# Patient Record
Sex: FEMALE | Race: WHITE | NOT HISPANIC OR LATINO | Employment: OTHER | URBAN - NONMETROPOLITAN AREA
[De-identification: names, ages, dates, MRNs, and addresses within clinical notes are randomized per-mention and may not be internally consistent; named-entity substitution may affect disease eponyms.]

---

## 2023-03-06 ENCOUNTER — TELEPHONE (OUTPATIENT)
Dept: PRIMARY CARE | Facility: CLINIC | Age: 68
End: 2023-03-06
Payer: MEDICARE

## 2023-03-06 DIAGNOSIS — J02.9 ACUTE PHARYNGITIS, UNSPECIFIED ETIOLOGY: Primary | ICD-10-CM

## 2023-03-06 RX ORDER — AMOXICILLIN AND CLAVULANATE POTASSIUM 875; 125 MG/1; MG/1
875 TABLET, FILM COATED ORAL 2 TIMES DAILY
Qty: 20 TABLET | Refills: 0 | Status: SHIPPED | OUTPATIENT
Start: 2023-03-06 | End: 2023-03-16

## 2023-03-06 NOTE — TELEPHONE ENCOUNTER
----- Message from Hyacinth Harper DO sent at 3/6/2023 10:00 AM EST -----  Went to [facility] care on Friday, given zpack, not helping

## 2023-03-20 ENCOUNTER — TELEPHONE (OUTPATIENT)
Dept: PRIMARY CARE | Facility: CLINIC | Age: 68
End: 2023-03-20
Payer: MEDICARE

## 2023-03-20 DIAGNOSIS — Z12.2 ENCOUNTER FOR SCREENING FOR LUNG CANCER: Primary | ICD-10-CM

## 2023-06-05 DIAGNOSIS — E78.2 MIXED HYPERLIPIDEMIA: Primary | ICD-10-CM

## 2023-06-05 RX ORDER — EZETIMIBE 10 MG/1
10 TABLET ORAL DAILY
Qty: 90 TABLET | Refills: 3 | Status: SHIPPED | OUTPATIENT
Start: 2023-06-05 | End: 2024-05-31

## 2023-06-05 RX ORDER — EZETIMIBE 10 MG/1
10 TABLET ORAL DAILY
COMMUNITY
End: 2023-06-05 | Stop reason: SDUPTHER

## 2023-06-08 DIAGNOSIS — F17.210 CIGARETTE SMOKER: ICD-10-CM

## 2023-06-08 DIAGNOSIS — Z12.2 SCREENING FOR MALIGNANT NEOPLASM OF RESPIRATORY ORGAN: Primary | ICD-10-CM

## 2023-06-15 ENCOUNTER — TELEMEDICINE (OUTPATIENT)
Dept: PRIMARY CARE | Facility: CLINIC | Age: 68
End: 2023-06-15
Payer: MEDICARE

## 2023-06-15 DIAGNOSIS — J44.1 COPD EXACERBATION (MULTI): ICD-10-CM

## 2023-06-15 DIAGNOSIS — R93.89 ABNORMAL CT SCAN, CHEST: ICD-10-CM

## 2023-06-15 DIAGNOSIS — E11.9 TYPE 2 DIABETES MELLITUS WITHOUT COMPLICATION, WITHOUT LONG-TERM CURRENT USE OF INSULIN (MULTI): ICD-10-CM

## 2023-06-15 DIAGNOSIS — J43.2 CENTRILOBULAR EMPHYSEMA (MULTI): Primary | ICD-10-CM

## 2023-06-15 PROBLEM — F17.210 CIGARETTE SMOKER: Status: ACTIVE | Noted: 2023-06-15

## 2023-06-15 PROBLEM — J44.9 COPD (CHRONIC OBSTRUCTIVE PULMONARY DISEASE) (MULTI): Status: ACTIVE | Noted: 2023-06-15

## 2023-06-15 PROBLEM — I65.29 CAROTID ARTERY STENOSIS: Status: ACTIVE | Noted: 2023-06-15

## 2023-06-15 PROBLEM — E78.2 MIXED HYPERLIPIDEMIA: Status: ACTIVE | Noted: 2023-06-15

## 2023-06-15 PROBLEM — M50.20 COMPRESSED CERVICAL DISC: Status: ACTIVE | Noted: 2023-06-15

## 2023-06-15 PROBLEM — R42 DIZZINESS: Status: ACTIVE | Noted: 2023-06-15

## 2023-06-15 PROBLEM — I10 HTN (HYPERTENSION): Status: ACTIVE | Noted: 2023-06-15

## 2023-06-15 PROCEDURE — 99213 OFFICE O/P EST LOW 20 MIN: CPT | Performed by: INTERNAL MEDICINE

## 2023-06-15 RX ORDER — MELOXICAM 15 MG/1
7.5 TABLET ORAL
COMMUNITY
End: 2024-05-06 | Stop reason: WASHOUT

## 2023-06-15 RX ORDER — GABAPENTIN 300 MG/1
CAPSULE ORAL
COMMUNITY
End: 2024-02-29 | Stop reason: SDUPTHER

## 2023-06-15 RX ORDER — LORATADINE 10 MG/1
1 TABLET ORAL DAILY
COMMUNITY

## 2023-06-15 RX ORDER — DOXYCYCLINE 100 MG/1
100 CAPSULE ORAL 2 TIMES DAILY
Qty: 20 CAPSULE | Refills: 0 | Status: SHIPPED | OUTPATIENT
Start: 2023-06-15 | End: 2023-06-29 | Stop reason: ALTCHOICE

## 2023-06-15 RX ORDER — FLUTICASONE PROPIONATE 50 MCG
2 SPRAY, SUSPENSION (ML) NASAL
COMMUNITY

## 2023-06-15 RX ORDER — ALBUTEROL SULFATE 0.83 MG/ML
SOLUTION RESPIRATORY (INHALATION)
COMMUNITY
Start: 2022-05-27

## 2023-06-15 RX ORDER — DOXYCYCLINE 100 MG/1
100 CAPSULE ORAL 2 TIMES DAILY
Qty: 14 CAPSULE | Refills: 0 | Status: SHIPPED | OUTPATIENT
Start: 2023-06-15 | End: 2023-06-15 | Stop reason: SDUPTHER

## 2023-06-15 RX ORDER — METFORMIN HYDROCHLORIDE 500 MG/1
1000 TABLET, EXTENDED RELEASE ORAL 2 TIMES DAILY
COMMUNITY
Start: 2017-03-15 | End: 2024-02-29 | Stop reason: SDUPTHER

## 2023-06-15 ASSESSMENT — ENCOUNTER SYMPTOMS
SHORTNESS OF BREATH: 1
RHINORRHEA: 0
FATIGUE: 0
DIZZINESS: 1
FEVER: 0
APPETITE CHANGE: 0
WHEEZING: 1
ACTIVITY CHANGE: 0
COUGH: 1

## 2023-06-29 RX ORDER — MELATONIN 10 MG
1 CAPSULE ORAL NIGHTLY
COMMUNITY

## 2023-06-29 RX ORDER — MEDROXYPROGESTERONE ACETATE 10 MG/1
10 TABLET ORAL DAILY
COMMUNITY
End: 2024-03-05

## 2023-06-29 RX ORDER — VALSARTAN 40 MG/1
40 TABLET ORAL DAILY
COMMUNITY
End: 2024-02-29 | Stop reason: SDUPTHER

## 2023-06-29 RX ORDER — ESTRADIOL 0.1 MG/G
CREAM VAGINAL
COMMUNITY
Start: 2022-12-15

## 2023-06-29 RX ORDER — BLOOD-GLUCOSE METER
KIT MISCELLANEOUS
COMMUNITY
Start: 2019-03-29

## 2023-06-30 ENCOUNTER — TELEPHONE (OUTPATIENT)
Dept: PRIMARY CARE | Facility: CLINIC | Age: 68
End: 2023-06-30

## 2023-06-30 ENCOUNTER — OFFICE VISIT (OUTPATIENT)
Dept: PRIMARY CARE | Facility: CLINIC | Age: 68
End: 2023-06-30
Payer: MEDICARE

## 2023-06-30 VITALS
SYSTOLIC BLOOD PRESSURE: 138 MMHG | DIASTOLIC BLOOD PRESSURE: 80 MMHG | HEIGHT: 64 IN | WEIGHT: 105 LBS | HEART RATE: 108 BPM | BODY MASS INDEX: 17.93 KG/M2

## 2023-06-30 DIAGNOSIS — R42 DIZZINESS: Primary | ICD-10-CM

## 2023-06-30 DIAGNOSIS — R93.0 ABNORMAL CT OF THE HEAD: ICD-10-CM

## 2023-06-30 PROCEDURE — 1159F MED LIST DOCD IN RCRD: CPT | Performed by: INTERNAL MEDICINE

## 2023-06-30 PROCEDURE — 4010F ACE/ARB THERAPY RXD/TAKEN: CPT | Performed by: INTERNAL MEDICINE

## 2023-06-30 PROCEDURE — 1157F ADVNC CARE PLAN IN RCRD: CPT | Performed by: INTERNAL MEDICINE

## 2023-06-30 PROCEDURE — 3079F DIAST BP 80-89 MM HG: CPT | Performed by: INTERNAL MEDICINE

## 2023-06-30 PROCEDURE — 1160F RVW MEDS BY RX/DR IN RCRD: CPT | Performed by: INTERNAL MEDICINE

## 2023-06-30 PROCEDURE — 3044F HG A1C LEVEL LT 7.0%: CPT | Performed by: INTERNAL MEDICINE

## 2023-06-30 PROCEDURE — 3075F SYST BP GE 130 - 139MM HG: CPT | Performed by: INTERNAL MEDICINE

## 2023-06-30 PROCEDURE — 99214 OFFICE O/P EST MOD 30 MIN: CPT | Performed by: INTERNAL MEDICINE

## 2023-06-30 RX ORDER — MECLIZINE HYDROCHLORIDE 25 MG/1
TABLET ORAL
COMMUNITY
Start: 2023-06-19 | End: 2023-06-30 | Stop reason: SINTOL

## 2023-06-30 ASSESSMENT — ENCOUNTER SYMPTOMS: DIZZINESS: 1

## 2023-06-30 NOTE — PROGRESS NOTES
"Subjective   Patient ID: Nanette Elliott is a 67 y.o. female who presents for ER Follow-up (Dizziness/Vertigo -Given Meclizine but it makes her sleepy so she does not take it/Lung infection).  ER Follow-up      Patient is here today for ED follow up  Patient was seen in the ED on 6/19/23 for dizziness. She reports that that the dizziness is constant, but now It is better than it was.  She reports that he dizziness has been for over a month.  Had advised her to lower her blood pressure mediation and see if that helped, did not change much.   She had a head cT that was negative for acute CVA but showed chronic microvascular ischemic versus chronic hypertensive changes, presumed old lacunar type infarct versus dilated perivascular space of the left inferior basal ganglia, additional small old-appearing lacunar type infarct of the left basal ganglia in the region of the anterior limb of the internal capsule on the left. This is similar to 2019 CT scan.     Review of Systems   Neurological:  Positive for dizziness.       Objective   /80 (BP Location: Right arm, Patient Position: Sitting, BP Cuff Size: Adult)   Pulse 108   Ht 1.626 m (5' 4\")   Wt 47.6 kg (105 lb)   BMI 18.02 kg/m²     Physical Exam  Constitutional:       General: She is not in acute distress.     Appearance: Normal appearance.   HENT:      Head: Normocephalic and atraumatic.      Right Ear: Tympanic membrane, ear canal and external ear normal.      Left Ear: Ear canal and external ear normal.      Nose: Nose normal.      Mouth/Throat:      Mouth: Mucous membranes are moist.      Pharynx: No oropharyngeal exudate or posterior oropharyngeal erythema.   Eyes:      General:         Right eye: No discharge.         Left eye: No discharge.      Extraocular Movements: Extraocular movements intact.      Pupils: Pupils are equal, round, and reactive to light.   Cardiovascular:      Rate and Rhythm: Normal rate and regular rhythm.      Pulses: Normal " pulses.      Heart sounds: No murmur heard.  Pulmonary:      Effort: Pulmonary effort is normal. No respiratory distress.      Breath sounds: No wheezing or rhonchi.   Neurological:      Mental Status: She is alert.   Psychiatric:         Mood and Affect: Mood normal.         Behavior: Behavior normal.         Thought Content: Thought content normal.         Interpreted By:  MT VANCE MD  MRN: [id number]  Patient Name: MARIAELENA ZAVALA     STUDY:  CT HEAD WO CONTRAST;  6/19/2023 10:52 am     INDICATION:  dizzy .     COMPARISON:  Head CT 03/27/2019     ACCESSION NUMBER(S):  [id number]     ORDERING CLINICIAN:  YOON MARK     TECHNIQUE:  Noncontrast axial CT scan of head was performed. Angled reformats in  brain and bone windows were generated. The images were reviewed in  bone, brain, blood and soft tissue windows.     FINDINGS:  CSF Spaces: Ventricles appear stable with respect to size and  configuration. There is no extraaxial fluid collection.     Parenchyma: Periventricular and subcortical white matter  hypoattenuation is nonspecific, however likely reflects chronic  microvascular ischemic versus chronic hypertensive changes. Presumed  old lacunar type infarct versus dilated perivascular space of the  left inferior basal ganglia. Additional small old-appearing lacunar  type infarct of the left basal ganglia in the region of the anterior  limb of the internal capsule along the left. The grey-white  differentiation is intact. There is no mass effect or midline shift.  There is no intracranial hemorrhage.     Calvarium: No acute displaced calvarial fracture.     Paranasal sinuses and mastoids: Visualized paranasal sinuses and  mastoids are clear.     IMPRESSION:  No CT evidence of acute intracranial pathology.     Similar intracranial findings since comparison head CT 03/27/2019.  Assessment/Plan   Problem List Items Addressed This Visit       Dizziness - Primary     Dizziness   - HEENT exam ok, possible  previous stroke, she had similar findings in CT head from 2019, will order MRI Brain wo contrast to see definitely if she has had a previous stroke  - advised trying to cut antivert in half and see if that helps   - consider vestibular therapy   - pt advised to call if anything changes       Addendum 8/8/23  Pt called office stating stilll having dizzines and unsteadiness, asking for rolator walker with seat. This is medially necessary as a cane does not provide enough stability and is important to keep her from falling as she is a high falls risk. Patient has MRI brain pending to see about her having previous unknown strokes.   Final diagnoses:   [R42] Dizziness

## 2023-06-30 NOTE — TELEPHONE ENCOUNTER
Pt states she forgot to tell you at her visit that she feels like she always feels like she has to have a bowel movement but doesn't go all the time

## 2023-07-07 ENCOUNTER — PATIENT OUTREACH (OUTPATIENT)
Dept: CARE COORDINATION | Facility: CLINIC | Age: 68
End: 2023-07-07
Payer: MEDICARE

## 2023-07-07 NOTE — PROGRESS NOTES
Discharge Facility: [facility]  Discharge Diagnosis: Syncope  Admission Date: 7/4/2023  Discharge Date: 7/5/2023    PCP Appointment Date: 7/18/2023 1310    Hospital Encounter and Summary: Linked    See discharge assessment below for further details    Engagement  Call Start Time: 1016 (7/7/2023 10:17 AM)    Medications  Medications reviewed with patient/caregiver?: Not applicable (7/7/2023 10:17 AM)  Does the patient have all medications ordered at discharge?: Not applicable (7/7/2023 10:17 AM)  Care Management Interventions: No intervention needed (7/7/2023 10:17 AM)  Is the patient taking all medications as directed (includes completed medication regime)?: Yes (7/7/2023 10:17 AM)    Appointments  Does the patient have a primary care provider?: Yes (7/7/2023 10:17 AM)  Care Management Interventions: Verified appointment date/time/provider (7/7/2023 10:17 AM)  Has the patient kept scheduled appointments due by today?: Yes (7/7/2023 10:17 AM)    Self Management  Has home health visited the patient within 72 hours of discharge?: Not applicable (7/7/2023 10:17 AM)    Patient Teaching  Does the patient have access to their discharge instructions?: Yes (7/7/2023 10:17 AM)  Care Management Interventions: Reviewed instructions with patient (7/7/2023 10:17 AM)  What is the patient's perception of their health status since discharge?: Improving (7/7/2023 10:17 AM)  Is the patient/caregiver able to teach back the hierarchy of who to call/visit for symptoms/problems? PCP, Specialist, Home Health nurse, Urgent Care, ED, 911: Yes (7/7/2023 10:17 AM)    Wrap Up  Wrap Up Additional Comments: Pt reports feeling much better since being home. Pt states the dizziness comes and goes. Pt was discharged with a Holter monitor. Pt denies any questions, needs or concerns at this time. Pt has follow up pcp appt 7/18/2023 1310, verified with pt. Pt encouraged to call if any issues arise. (7/7/2023 10:17 AM)  Call End Time: 1021 (7/7/2023 10:17  AM)

## 2023-07-18 ENCOUNTER — OFFICE VISIT (OUTPATIENT)
Dept: PRIMARY CARE | Facility: CLINIC | Age: 68
End: 2023-07-18
Payer: MEDICARE

## 2023-07-18 VITALS
HEIGHT: 65 IN | TEMPERATURE: 98.7 F | BODY MASS INDEX: 17.49 KG/M2 | OXYGEN SATURATION: 96 % | SYSTOLIC BLOOD PRESSURE: 130 MMHG | HEART RATE: 96 BPM | WEIGHT: 105 LBS | DIASTOLIC BLOOD PRESSURE: 71 MMHG

## 2023-07-18 DIAGNOSIS — R42 VERTIGO: Primary | ICD-10-CM

## 2023-07-18 PROCEDURE — 4010F ACE/ARB THERAPY RXD/TAKEN: CPT | Performed by: PHYSICIAN ASSISTANT

## 2023-07-18 PROCEDURE — 3078F DIAST BP <80 MM HG: CPT | Performed by: PHYSICIAN ASSISTANT

## 2023-07-18 PROCEDURE — 99214 OFFICE O/P EST MOD 30 MIN: CPT | Performed by: PHYSICIAN ASSISTANT

## 2023-07-18 PROCEDURE — 3075F SYST BP GE 130 - 139MM HG: CPT | Performed by: PHYSICIAN ASSISTANT

## 2023-07-18 PROCEDURE — 1159F MED LIST DOCD IN RCRD: CPT | Performed by: PHYSICIAN ASSISTANT

## 2023-07-18 PROCEDURE — 99495 TRANSJ CARE MGMT MOD F2F 14D: CPT | Performed by: PHYSICIAN ASSISTANT

## 2023-07-18 PROCEDURE — 3044F HG A1C LEVEL LT 7.0%: CPT | Performed by: PHYSICIAN ASSISTANT

## 2023-07-18 PROCEDURE — 1157F ADVNC CARE PLAN IN RCRD: CPT | Performed by: PHYSICIAN ASSISTANT

## 2023-07-18 PROCEDURE — 1160F RVW MEDS BY RX/DR IN RCRD: CPT | Performed by: PHYSICIAN ASSISTANT

## 2023-07-18 RX ORDER — HYDROXYZINE HYDROCHLORIDE 25 MG/1
TABLET, FILM COATED ORAL
Qty: 45 TABLET | Refills: 0 | Status: SHIPPED | OUTPATIENT
Start: 2023-07-18 | End: 2024-05-06 | Stop reason: WASHOUT

## 2023-07-18 ASSESSMENT — PATIENT HEALTH QUESTIONNAIRE - PHQ9
1. LITTLE INTEREST OR PLEASURE IN DOING THINGS: SEVERAL DAYS
2. FEELING DOWN, DEPRESSED OR HOPELESS: SEVERAL DAYS
SUM OF ALL RESPONSES TO PHQ9 QUESTIONS 1 AND 2: 2

## 2023-07-18 NOTE — PROGRESS NOTES
"Subjective   Patient ID: Nanette Elliott is a 67 y.o. female who presents for Hospital Follow-up ( hospital discharge x 2 weeks ago for vertigo and fainting./Patient states told she was dehydrated./Continues with vertigo throughout the day that last most of the day.).  HPI  Patient presents in hospital follow-up.  Patient presented to the ER with a syncopal episode/dizziness.  CT head in ER was negative labs are relatively unremarkable vitals were stable and the patient was admitted.  Patient continued work-up for CVA including MRI of head and neck which were also unremarkable.  Serial troponins were negative patient remained stable while admitted.  Patient states vertigo has been going on for months and has waxed and waned.  Patient was prescribed meclizine the patient states this is too sedative to tolerate.    Currently, the patient is symptomatic.  Review of Systems    Constitutional:  See HPI   ENT: See HPI  Neurologic:  Alert and oriented X4, No numbness, No tingling.    All other systems are negative     Objective     /71   Pulse 96   Temp 37.1 °C (98.7 °F) (Temporal)   Ht 1.651 m (5' 5\")   Wt 47.6 kg (105 lb)   SpO2 96%   BMI 17.47 kg/m²     Physical Exam    General:  Alert and oriented, No acute distress.    Eye:  Pupils are equal, round and reactive to light, Normal conjunctiva.  Right lateral nystagmus noted, recreated complaint  HENT:  Normocephalic,   Neck:  Supple    Respiratory: Respirations are non-labored   Musculoskeletal: Normal ROM and strength  Integumentary:  Warm, Dry, Intact, No pallor, No rash.    Neurologic:  Alert, Oriented, Normal sensory, Cranial Nerves II-XII are grossly intact  Psychiatric:  Cooperative, Appropriate mood & affect.    Assessment/Plan   Vertigo: We will try hydroxyzine for symptoms.  Patient advised to try half a pill if 1 is to sedative.  Referrals to both ENT and vestibular physical therapy were ordered.  Problem List Items Addressed This Visit  "   None  Visit Diagnoses       Vertigo    -  Primary    Relevant Medications    hydrOXYzine HCL (Atarax) 25 mg tablet    Other Relevant Orders    Referral to ENT    Referral to Physical Therapy            Final diagnoses:   [R42] Vertigo

## 2023-07-21 ENCOUNTER — PATIENT OUTREACH (OUTPATIENT)
Dept: CARE COORDINATION | Facility: CLINIC | Age: 68
End: 2023-07-21
Payer: MEDICARE

## 2023-07-21 NOTE — PROGRESS NOTES
"Call regarding appt. with PCP on 7/18/2023 after hospitalization.  At time of outreach call the patient feels as if their condition has improved slightly since last visit. Pt reports some dizziness but \"not as bad as it was\".  Reviewed the PCP appointment with the pt and addressed any questions or concerns.  Pt states she received ENT referral and has appt with Dr. Anderson 7/25.   Pt states after trying to take half dose of hydroxyzine she is still feeling drowsy and is unable to tolerate it.  Pt denies any other questions, needs, or concerns.  Pt encouraged to call if questions arise.  "

## 2023-08-07 ENCOUNTER — TELEPHONE (OUTPATIENT)
Dept: PRIMARY CARE | Facility: CLINIC | Age: 68
End: 2023-08-07
Payer: MEDICARE

## 2023-08-17 DIAGNOSIS — J01.00 ACUTE NON-RECURRENT MAXILLARY SINUSITIS: ICD-10-CM

## 2023-08-17 RX ORDER — AMOXICILLIN 875 MG/1
875 TABLET, FILM COATED ORAL 2 TIMES DAILY
Qty: 20 TABLET | Refills: 0 | Status: SHIPPED | OUTPATIENT
Start: 2023-08-17 | End: 2023-08-27

## 2023-08-21 ENCOUNTER — PATIENT OUTREACH (OUTPATIENT)
Dept: CARE COORDINATION | Facility: CLINIC | Age: 68
End: 2023-08-21
Payer: MEDICARE

## 2023-08-21 NOTE — PROGRESS NOTES
Unable to reach patient for one month post discharge follow up call.   Left voicemail with call back number for patient to call if needed   If no voicemail available call attempts x 2 were made to contact the patient to assist with any questions or concerns patient may have.

## 2023-09-13 ENCOUNTER — LAB (OUTPATIENT)
Dept: LAB | Facility: LAB | Age: 68
End: 2023-09-13
Payer: MEDICARE

## 2023-09-13 DIAGNOSIS — E11.9 TYPE 2 DIABETES MELLITUS WITHOUT COMPLICATION, WITHOUT LONG-TERM CURRENT USE OF INSULIN (MULTI): ICD-10-CM

## 2023-09-13 LAB
ALANINE AMINOTRANSFERASE (SGPT) (U/L) IN SER/PLAS: 9 U/L (ref 7–45)
ALBUMIN (G/DL) IN SER/PLAS: 4.3 G/DL (ref 3.4–5)
ALKALINE PHOSPHATASE (U/L) IN SER/PLAS: 19 U/L (ref 33–136)
ANION GAP IN SER/PLAS: 14 MMOL/L (ref 10–20)
ASPARTATE AMINOTRANSFERASE (SGOT) (U/L) IN SER/PLAS: 11 U/L (ref 9–39)
BILIRUBIN TOTAL (MG/DL) IN SER/PLAS: 0.3 MG/DL (ref 0–1.2)
CALCIUM (MG/DL) IN SER/PLAS: 9.3 MG/DL (ref 8.6–10.3)
CARBON DIOXIDE, TOTAL (MMOL/L) IN SER/PLAS: 27 MMOL/L (ref 21–32)
CHLORIDE (MMOL/L) IN SER/PLAS: 104 MMOL/L (ref 98–107)
CREATININE (MG/DL) IN SER/PLAS: 0.7 MG/DL (ref 0.5–1.05)
ESTIMATED AVERAGE GLUCOSE FOR HBA1C: 148 MG/DL
GFR FEMALE: >90 ML/MIN/1.73M2
GLUCOSE (MG/DL) IN SER/PLAS: 116 MG/DL (ref 74–99)
HEMOGLOBIN A1C/HEMOGLOBIN TOTAL IN BLOOD: 6.8 %
POTASSIUM (MMOL/L) IN SER/PLAS: 4.5 MMOL/L (ref 3.5–5.3)
PROTEIN TOTAL: 6.7 G/DL (ref 6.4–8.2)
SODIUM (MMOL/L) IN SER/PLAS: 140 MMOL/L (ref 136–145)
UREA NITROGEN (MG/DL) IN SER/PLAS: 16 MG/DL (ref 6–23)

## 2023-09-13 PROCEDURE — 80053 COMPREHEN METABOLIC PANEL: CPT

## 2023-09-13 PROCEDURE — 36415 COLL VENOUS BLD VENIPUNCTURE: CPT

## 2023-09-13 PROCEDURE — 83036 HEMOGLOBIN GLYCOSYLATED A1C: CPT

## 2023-09-14 ENCOUNTER — OFFICE VISIT (OUTPATIENT)
Dept: PRIMARY CARE | Facility: CLINIC | Age: 68
End: 2023-09-14
Payer: MEDICARE

## 2023-09-14 VITALS
SYSTOLIC BLOOD PRESSURE: 139 MMHG | HEART RATE: 121 BPM | DIASTOLIC BLOOD PRESSURE: 78 MMHG | HEIGHT: 65 IN | WEIGHT: 108 LBS | BODY MASS INDEX: 17.99 KG/M2

## 2023-09-14 DIAGNOSIS — E78.2 MIXED HYPERLIPIDEMIA: ICD-10-CM

## 2023-09-14 DIAGNOSIS — I65.29 STENOSIS OF CAROTID ARTERY, UNSPECIFIED LATERALITY: ICD-10-CM

## 2023-09-14 DIAGNOSIS — F17.210 CIGARETTE SMOKER: ICD-10-CM

## 2023-09-14 DIAGNOSIS — J43.2 CENTRILOBULAR EMPHYSEMA (MULTI): ICD-10-CM

## 2023-09-14 DIAGNOSIS — R91.8 MULTIPLE LUNG NODULES: ICD-10-CM

## 2023-09-14 DIAGNOSIS — E11.9 TYPE 2 DIABETES MELLITUS WITHOUT COMPLICATION, WITHOUT LONG-TERM CURRENT USE OF INSULIN (MULTI): ICD-10-CM

## 2023-09-14 DIAGNOSIS — I10 PRIMARY HYPERTENSION: ICD-10-CM

## 2023-09-14 DIAGNOSIS — Z12.2 ENCOUNTER FOR SCREENING FOR LUNG CANCER: ICD-10-CM

## 2023-09-14 DIAGNOSIS — Z12.31 SCREENING MAMMOGRAM, ENCOUNTER FOR: Primary | ICD-10-CM

## 2023-09-14 PROCEDURE — 1159F MED LIST DOCD IN RCRD: CPT | Performed by: INTERNAL MEDICINE

## 2023-09-14 PROCEDURE — 1157F ADVNC CARE PLAN IN RCRD: CPT | Performed by: INTERNAL MEDICINE

## 2023-09-14 PROCEDURE — 4010F ACE/ARB THERAPY RXD/TAKEN: CPT | Performed by: INTERNAL MEDICINE

## 2023-09-14 PROCEDURE — 3044F HG A1C LEVEL LT 7.0%: CPT | Performed by: INTERNAL MEDICINE

## 2023-09-14 PROCEDURE — 3078F DIAST BP <80 MM HG: CPT | Performed by: INTERNAL MEDICINE

## 2023-09-14 PROCEDURE — 99214 OFFICE O/P EST MOD 30 MIN: CPT | Performed by: INTERNAL MEDICINE

## 2023-09-14 PROCEDURE — 3075F SYST BP GE 130 - 139MM HG: CPT | Performed by: INTERNAL MEDICINE

## 2023-09-14 PROCEDURE — 1160F RVW MEDS BY RX/DR IN RCRD: CPT | Performed by: INTERNAL MEDICINE

## 2023-09-14 ASSESSMENT — ENCOUNTER SYMPTOMS
WHEEZING: 0
DIARRHEA: 0
DIZZINESS: 1
ABDOMINAL DISTENTION: 0
BACK PAIN: 0
ACTIVITY CHANGE: 0
SORE THROAT: 0
SINUS PRESSURE: 0
CHILLS: 0
NUMBNESS: 0
VOMITING: 0
NAUSEA: 0
APPETITE CHANGE: 0
FATIGUE: 0
COUGH: 0
SINUS PAIN: 0
WEAKNESS: 0
SHORTNESS OF BREATH: 0

## 2023-09-14 NOTE — PROGRESS NOTES
"Subjective   Patient ID: Nanette Elliott is a 67 y.o. female who presents for Follow-up (6 month).  HPI  Patient is here today for 6 mo follow up   Reports that her vertigo is starting to get better.   Still having some yellow productive sputum but it is getting better.     Review of Systems   Constitutional:  Negative for activity change, appetite change, chills and fatigue.   HENT:  Negative for congestion, postnasal drip, sinus pressure, sinus pain and sore throat.    Respiratory:  Negative for cough, shortness of breath and wheezing.    Cardiovascular:  Negative for chest pain and leg swelling.   Gastrointestinal:  Negative for abdominal distention, diarrhea, nausea and vomiting.   Musculoskeletal:  Negative for back pain.   Neurological:  Positive for dizziness. Negative for weakness and numbness.       Objective   /78 (BP Location: Right arm, Patient Position: Sitting, BP Cuff Size: Adult)   Pulse (!) 121   Ht 1.651 m (5' 5\")   Wt 49 kg (108 lb)   BMI 17.97 kg/m²     Physical Exam  Constitutional:       General: She is not in acute distress.     Appearance: Normal appearance.   HENT:      Head: Normocephalic.      Nose: Nose normal.      Mouth/Throat:      Pharynx: No oropharyngeal exudate.   Eyes:      General:         Right eye: No discharge.         Left eye: No discharge.      Extraocular Movements: Extraocular movements intact.      Pupils: Pupils are equal, round, and reactive to light.   Cardiovascular:      Rate and Rhythm: Normal rate and regular rhythm.      Heart sounds: No murmur heard.     No gallop.   Pulmonary:      Effort: Pulmonary effort is normal. No respiratory distress.      Breath sounds: Normal breath sounds. No wheezing.   Musculoskeletal:         General: No swelling. Normal range of motion.   Skin:     General: Skin is warm and dry.      Coloration: Skin is not jaundiced.   Neurological:      General: No focal deficit present.      Mental Status: She is alert and oriented to " person, place, and time.      Cranial Nerves: No cranial nerve deficit.   Psychiatric:         Mood and Affect: Mood normal.         Behavior: Behavior normal.           Assessment/Plan   Problem List Items Addressed This Visit       Carotid artery stenosis    Cigarette smoker    Diabetes (CMS/[facility])    Relevant Orders    Hemoglobin A1C    Comprehensive Metabolic Panel    Lipid Panel    Albumin , Urine Random    COPD (chronic obstructive pulmonary disease) (CMS/[facility])    Mixed hyperlipidemia    HTN (hypertension)     Other Visit Diagnoses       Screening mammogram, encounter for    -  Primary    Relevant Orders    BI mammo bilateral screening tomosynthesis          1. Recurrent dysuria, saw Dr Mata  - on Urogesiv  - has not had any more urinary tract infections      2. COPD  - continue albuterol prn    - continue tudorza     3. Chronic back pain  - continue gabapentin  - continue tizanidine   - seeing pain management      4. HTN   - continue valsartan, will have her cut in half for a week and see if dizziness improves     5. DMII   - continue metformin   - A1c 6.8%      6. HLD   - chol higher off statin, did not tolerate muscle pain, continue zetia            7. Received 2023 flu shot yesterday

## 2023-09-21 ENCOUNTER — PATIENT OUTREACH (OUTPATIENT)
Dept: CARE COORDINATION | Facility: CLINIC | Age: 68
End: 2023-09-21
Payer: MEDICARE

## 2023-09-21 NOTE — PROGRESS NOTES
Unable to reach patient for follow up call regarding hospitalization in July.   Left voicemail with call back number for patient to call if needed   If no voicemail available call attempts x 2 were made to contact the patient to assist with any questions or concerns patient may have.

## 2023-09-26 ENCOUNTER — TELEPHONE (OUTPATIENT)
Dept: PRIMARY CARE | Facility: CLINIC | Age: 68
End: 2023-09-26
Payer: MEDICARE

## 2023-09-26 DIAGNOSIS — R91.8 LUNG NODULE, MULTIPLE: Primary | ICD-10-CM

## 2023-09-26 NOTE — TELEPHONE ENCOUNTER
Pt received letter for CT of her lung to be scheduled  Unsure if it should be just a CT or low dose screening; if you look at last scan it says 1-3 month follow up

## 2023-10-17 ENCOUNTER — TELEPHONE (OUTPATIENT)
Dept: PRIMARY CARE | Facility: CLINIC | Age: 68
End: 2023-10-17
Payer: MEDICARE

## 2023-10-17 DIAGNOSIS — Z13.820 ENCOUNTER FOR OSTEOPOROSIS SCREENING IN ASYMPTOMATIC POSTMENOPAUSAL PATIENT: Primary | ICD-10-CM

## 2023-10-17 DIAGNOSIS — Z78.0 ENCOUNTER FOR OSTEOPOROSIS SCREENING IN ASYMPTOMATIC POSTMENOPAUSAL PATIENT: Primary | ICD-10-CM

## 2023-10-19 ENCOUNTER — HOSPITAL ENCOUNTER (OUTPATIENT)
Dept: RADIOLOGY | Facility: HOSPITAL | Age: 68
Discharge: HOME | End: 2023-10-19
Payer: MEDICARE

## 2023-10-19 DIAGNOSIS — R91.8 MULTIPLE LUNG NODULES: ICD-10-CM

## 2023-10-19 PROCEDURE — 71250 CT THORAX DX C-: CPT | Performed by: RADIOLOGY

## 2023-10-19 PROCEDURE — 71250 CT THORAX DX C-: CPT

## 2023-10-24 ENCOUNTER — ANCILLARY PROCEDURE (OUTPATIENT)
Dept: RADIOLOGY | Facility: CLINIC | Age: 68
End: 2023-10-24
Payer: MEDICARE

## 2023-10-24 DIAGNOSIS — Z13.820 ENCOUNTER FOR OSTEOPOROSIS SCREENING IN ASYMPTOMATIC POSTMENOPAUSAL PATIENT: ICD-10-CM

## 2023-10-24 DIAGNOSIS — Z78.0 ENCOUNTER FOR OSTEOPOROSIS SCREENING IN ASYMPTOMATIC POSTMENOPAUSAL PATIENT: ICD-10-CM

## 2023-10-24 PROCEDURE — 77085 DXA BONE DENSITY AXL VRT FX: CPT | Performed by: RADIOLOGY

## 2023-10-24 PROCEDURE — 77080 DXA BONE DENSITY AXIAL: CPT

## 2023-10-27 DIAGNOSIS — M81.6 LOCALIZED OSTEOPOROSIS WITHOUT CURRENT PATHOLOGICAL FRACTURE: Primary | ICD-10-CM

## 2023-10-27 RX ORDER — ALENDRONATE SODIUM 70 MG/1
70 TABLET ORAL
Qty: 12 TABLET | Refills: 3 | Status: SHIPPED | OUTPATIENT
Start: 2023-10-27 | End: 2024-10-26

## 2023-12-11 ENCOUNTER — HOSPITAL ENCOUNTER (OUTPATIENT)
Dept: RADIOLOGY | Facility: HOSPITAL | Age: 68
Discharge: HOME | End: 2023-12-11
Payer: MEDICARE

## 2023-12-11 DIAGNOSIS — Z12.31 ENCOUNTER FOR SCREENING MAMMOGRAM FOR MALIGNANT NEOPLASM OF BREAST: ICD-10-CM

## 2023-12-11 PROCEDURE — 77063 BREAST TOMOSYNTHESIS BI: CPT

## 2023-12-11 PROCEDURE — 77063 BREAST TOMOSYNTHESIS BI: CPT | Mod: BILATERAL PROCEDURE | Performed by: RADIOLOGY

## 2023-12-11 PROCEDURE — 77067 SCR MAMMO BI INCL CAD: CPT | Mod: BILATERAL PROCEDURE | Performed by: RADIOLOGY

## 2024-01-16 ENCOUNTER — OFFICE VISIT (OUTPATIENT)
Dept: OBSTETRICS AND GYNECOLOGY | Facility: CLINIC | Age: 69
End: 2024-01-16
Payer: MEDICARE

## 2024-01-16 VITALS
HEIGHT: 65 IN | BODY MASS INDEX: 17.59 KG/M2 | SYSTOLIC BLOOD PRESSURE: 118 MMHG | WEIGHT: 105.6 LBS | DIASTOLIC BLOOD PRESSURE: 66 MMHG

## 2024-01-16 DIAGNOSIS — N76.0 BV (BACTERIAL VAGINOSIS): Primary | ICD-10-CM

## 2024-01-16 DIAGNOSIS — B96.89 BV (BACTERIAL VAGINOSIS): Primary | ICD-10-CM

## 2024-01-16 LAB
POC CLUE CELL WET PREP: PRESENT
POC TRICHOMONAS WET PREP: ABNORMAL
POC WBC WET PREP: ABNORMAL
POC YEAST CELLS WET PREP: ABNORMAL

## 2024-01-16 PROCEDURE — 3078F DIAST BP <80 MM HG: CPT | Performed by: OBSTETRICS & GYNECOLOGY

## 2024-01-16 PROCEDURE — 99213 OFFICE O/P EST LOW 20 MIN: CPT | Performed by: OBSTETRICS & GYNECOLOGY

## 2024-01-16 PROCEDURE — 87210 SMEAR WET MOUNT SALINE/INK: CPT | Performed by: OBSTETRICS & GYNECOLOGY

## 2024-01-16 PROCEDURE — 4010F ACE/ARB THERAPY RXD/TAKEN: CPT | Performed by: OBSTETRICS & GYNECOLOGY

## 2024-01-16 PROCEDURE — 1160F RVW MEDS BY RX/DR IN RCRD: CPT | Performed by: OBSTETRICS & GYNECOLOGY

## 2024-01-16 PROCEDURE — 3074F SYST BP LT 130 MM HG: CPT | Performed by: OBSTETRICS & GYNECOLOGY

## 2024-01-16 PROCEDURE — 1159F MED LIST DOCD IN RCRD: CPT | Performed by: OBSTETRICS & GYNECOLOGY

## 2024-01-16 RX ORDER — METRONIDAZOLE 7.5 MG/G
GEL VAGINAL DAILY
Qty: 70 G | Refills: 0 | Status: SHIPPED | OUTPATIENT
Start: 2024-01-16 | End: 2024-01-21

## 2024-01-16 NOTE — PROGRESS NOTES
Nanette Elliott is a 68 y.o. year old female patient.  PCP = Hyacinth Harper DO    Chief Complaint   Patient presents with    Vaginitis/Bacterial Vaginosis     New patient here with c/o vaginal odor and discharge that started about a month ago. Patient states she has some itching at times.       HPI   Presents stating that she has noticed a vaginal odor and discharge over the last month.  She will also notice some episodes of itching.  Denies any vaginal bleeding.  Denies any recent antibiotic use.    OB History          2    Para   2    Term   2       0    AB   0    Living   2         SAB   0    IAB   0    Ectopic   0    Multiple   0    Live Births   2                 Past Medical History:   Diagnosis Date    Asymptomatic menopausal state     History of menopause    Encounter for full-term uncomplicated delivery     Normal vaginal delivery    Encounter for gynecological examination (general) (routine) without abnormal findings 2021    Pap test, as part of routine gynecological examination    Other conditions influencing health status     Menstruation    Personal history of other medical treatment     History of screening mammography       Past Surgical History:   Procedure Laterality Date    MR HEAD ANGIO WO IV CONTRAST  2023    MR HEAD ANGIO WO IV CONTRAST 2023 [facility] MRI    MR NECK ANGIO WO IV CONTRAST  2023    MR NECK ANGIO WO IV CONTRAST 2023 [facility] MRI    OTHER SURGICAL HISTORY  2020    Neck surgery    OTHER SURGICAL HISTORY  2020    Cataract surgery    OTHER SURGICAL HISTORY  2020    Colonoscopy    OTHER SURGICAL HISTORY  2022    Epidural steroid injection    OTHER SURGICAL HISTORY  10/20/2022    Radiofrequency ablation    OTHER SURGICAL HISTORY  2022    Radiofrequency ablation    OTHER SURGICAL HISTORY  2022    Medial branch block    OTHER SURGICAL HISTORY  2022    Medial branch block    OTHER SURGICAL HISTORY  2022     Medial branch block    OTHER SURGICAL HISTORY  03/30/2022    Medial branch block    OTHER SURGICAL HISTORY  10/27/2020    Esophagogastroduodenoscopy    OTHER SURGICAL HISTORY  10/27/2020    Condyloma acuminata excision    OTHER SURGICAL HISTORY  08/19/2021    Epidural steroid injection    TUBAL LIGATION Bilateral 1980       Review of Systems:   Constitutional: No fever or chills  Respiratory: No shortness of breath, or cough  Cardiovascular: No chest pain or syncope  Breasts: No breast pain, no masses, no nipple discharge  Gastrointestinal: No nausea, vomiting, or diarrhea, no abdominal pain  Genitourinary: No dysuria or frequency  Gynecology: Negative except as noted in history of present illness  All other: All other systems reviewed and negative for complaint    Medication Documentation Review Audit       Reviewed by Alli Nicole MD (Physician) on 01/16/24 at 1424      Medication Order Taking? Sig Documenting Provider Last Dose Status   aclidinium (Tudorza) 400 mcg/actuation inhaler [id number]  Inhale 1 puff 2 times a day. Historical Provider, MD  Active   albuterol 2.5 mg /3 mL (0.083 %) nebulizer solution [id number]  Inhale. Historical Provider, MD  Active   alendronate (Fosamax) 70 mg tablet [id number]  Take 1 tablet (70 mg) by mouth every 7 days. Take in the morning with a full glass of water, on an empty stomach, and do not take anything else by mouth or lie down for the next 30 min. Hyacinth Harper DO  Active   estradiol (Estrace) 0.01 % (0.1 mg/gram) vaginal cream [id number]  Insert into the vagina. Sia Palacio MD  Active   ezetimibe (Zetia) 10 mg tablet [id number]  Take 1 tablet (10 mg) by mouth once daily. Hyacinth Harper DO  Active   fluticasone (Flonase) 50 mcg/actuation nasal spray [id number]  Administer 2 sprays into affected nostril(s). Sia Provider, MD  Active   FreeStyle Lite Strips strip [id number]  TEST BLOOD SUGARS 1-2 TIMES PER DAY Historical Provider, MD  Active   gabapentin  "(Neurontin) 300 mg capsule [id number]  TAKE 2 CAPS IN THE MORNING, 2 CAPS IN THE AFTERNOON, 3-4 CAPS AT BEDTIME Historical Provider, MD  Active   hydrOXYzine HCL (Atarax) 25 mg tablet [id number]  Take every 6 hours prn dizziness Marcos Roldan PA-C  Active   loratadine (Claritin) 10 mg tablet [id number]  Take 1 tablet (10 mg) by mouth once daily. Historical Provider, MD  Active   medroxyPROGESTERone (Provera) 10 mg tablet [id number]  Take 1 tablet (10 mg) by mouth once daily. Historical Provider, MD  Active   melatonin 10 mg capsule [id number]  Take 1 capsule (10 mg) by mouth once daily at bedtime. Historical Provider, MD  Active   meloxicam (Mobic) 15 mg tablet [id number]  Take 0.5 tablets (7.5 mg) by mouth once daily. Historical Provider, MD  Active   metFORMIN XR (Glucophage-XR) 500 mg 24 hr tablet [id number]  Take 2 tablets (1,000 mg) by mouth twice a day. Historical Provider, MD  Active   valsartan (Diovan) 40 mg tablet [id number]  Take 1 tablet (40 mg) by mouth once daily. Historical Provider, MD  Active                     /66   Ht 1.651 m (5' 5\")   Wt 47.9 kg (105 lb 9.6 oz)   BMI 17.57 kg/m²     PHYSICAL EXAMINATION:  Well-developed, well nourished, in no acute distress, alert and oriented x three, is pleasant and cooperative.  HEENT: Clear. Pupils equal, round and reactive to light and accommodation. Extraocular muscles are intact. Oral mucosa pink without exudate.   NECK: No lymphadenopathy, no thyromegaly.  LUNGS: Clear bilaterally.  HEART: Regular rate and rhythm without murmurs.  ABDOMEN: Normoactive bowel sounds, soft and nontender, no guarding or rebound tenderness, no CVA tenderness.  EXTREMITIES: No clubbing, cyanosis or edema.  NEUROLOGIC:  Cranial nerves II-XII grossly intact.  :  Normal external female genitalia, normal vulva, normal vagina. Normal urethral meatus, urethra and bladder. Normal appearing cervix. Normal-sized uterus, no adnexal masses or tenderness.  Wet prep was performed showing " bacterial vaginosis and negative for yeast or trichomonas.        Orders Placed This Encounter   Procedures    POCT Wet Mount manually resulted     Order Specific Question:   Release result to [facility]     Answer:   Immediate [1]        Problem List Items Addressed This Visit    None  Visit Diagnoses       BV (bacterial vaginosis)    -  Primary    Relevant Medications    metroNIDAZOLE (Metrogel) 0.75 % (37.5mg/5 gram) vaginal gel    Other Relevant Orders    POCT Wet Mount manually resulted             Provider Impression:  1.  Bacterial vaginosis  Prescription for MetroGel vaginal for 5 nights.  Follow-up in the office as needed.

## 2024-02-29 DIAGNOSIS — E11.9 TYPE 2 DIABETES MELLITUS WITHOUT COMPLICATION, WITHOUT LONG-TERM CURRENT USE OF INSULIN (MULTI): Primary | ICD-10-CM

## 2024-02-29 DIAGNOSIS — E11.9 TYPE 2 DIABETES MELLITUS WITHOUT COMPLICATION, WITHOUT LONG-TERM CURRENT USE OF INSULIN (MULTI): ICD-10-CM

## 2024-02-29 DIAGNOSIS — M50.20 COMPRESSED CERVICAL DISC: ICD-10-CM

## 2024-02-29 DIAGNOSIS — I10 PRIMARY HYPERTENSION: ICD-10-CM

## 2024-02-29 RX ORDER — VALSARTAN 40 MG/1
40 TABLET ORAL DAILY
Qty: 90 TABLET | Refills: 3 | Status: SHIPPED | OUTPATIENT
Start: 2024-02-29

## 2024-02-29 RX ORDER — GABAPENTIN 300 MG/1
CAPSULE ORAL
Qty: 240 CAPSULE | Refills: 3 | Status: SHIPPED | OUTPATIENT
Start: 2024-02-29 | End: 2024-03-18 | Stop reason: SDUPTHER

## 2024-02-29 RX ORDER — METFORMIN HYDROCHLORIDE 500 MG/1
1000 TABLET, EXTENDED RELEASE ORAL
Qty: 360 TABLET | Refills: 3 | Status: SHIPPED | OUTPATIENT
Start: 2024-02-29

## 2024-02-29 RX ORDER — METFORMIN HYDROCHLORIDE 500 MG/1
1000 TABLET, EXTENDED RELEASE ORAL
Qty: 180 TABLET | Refills: 3 | Status: SHIPPED | OUTPATIENT
Start: 2024-02-29 | End: 2024-02-29 | Stop reason: SDUPTHER

## 2024-02-29 RX ORDER — GABAPENTIN 300 MG/1
300 CAPSULE ORAL 3 TIMES DAILY
Qty: 720 CAPSULE | Refills: 3 | Status: SHIPPED | OUTPATIENT
Start: 2024-02-29 | End: 2024-02-29 | Stop reason: SDUPTHER

## 2024-03-01 ENCOUNTER — TELEPHONE (OUTPATIENT)
Dept: OBSTETRICS AND GYNECOLOGY | Facility: CLINIC | Age: 69
End: 2024-03-01
Payer: MEDICARE

## 2024-03-01 DIAGNOSIS — N76.0 BV (BACTERIAL VAGINOSIS): Primary | ICD-10-CM

## 2024-03-01 DIAGNOSIS — B96.89 BV (BACTERIAL VAGINOSIS): Primary | ICD-10-CM

## 2024-03-01 RX ORDER — METRONIDAZOLE 7.5 MG/G
GEL VAGINAL DAILY
Qty: 70 G | Refills: 0 | Status: SHIPPED | OUTPATIENT
Start: 2024-03-01 | End: 2024-03-06

## 2024-03-01 NOTE — TELEPHONE ENCOUNTER
Patient called in, She is still having SX of odor and discharge from when she saw you in January.

## 2024-03-04 DIAGNOSIS — Z78.0 POST-MENOPAUSAL: ICD-10-CM

## 2024-03-04 DIAGNOSIS — J44.9 CHRONIC OBSTRUCTIVE PULMONARY DISEASE, UNSPECIFIED (MULTI): Primary | ICD-10-CM

## 2024-03-05 RX ORDER — MEDROXYPROGESTERONE ACETATE 10 MG/1
10 TABLET ORAL DAILY
Qty: 90 TABLET | Refills: 5 | Status: SHIPPED | OUTPATIENT
Start: 2024-03-05

## 2024-03-05 RX ORDER — ALBUTEROL SULFATE 90 UG/1
AEROSOL, METERED RESPIRATORY (INHALATION)
Qty: 25.5 G | Refills: 5 | Status: SHIPPED | OUTPATIENT
Start: 2024-03-05

## 2024-03-13 ENCOUNTER — LAB (OUTPATIENT)
Dept: LAB | Facility: LAB | Age: 69
End: 2024-03-13
Payer: MEDICARE

## 2024-03-13 DIAGNOSIS — E11.9 TYPE 2 DIABETES MELLITUS WITHOUT COMPLICATION, WITHOUT LONG-TERM CURRENT USE OF INSULIN (MULTI): ICD-10-CM

## 2024-03-13 LAB
ALBUMIN SERPL BCP-MCNC: 4.5 G/DL (ref 3.4–5)
ALP SERPL-CCNC: 15 U/L (ref 33–136)
ALT SERPL W P-5'-P-CCNC: 13 U/L (ref 7–45)
ANION GAP SERPL CALC-SCNC: 15 MMOL/L (ref 10–20)
AST SERPL W P-5'-P-CCNC: 15 U/L (ref 9–39)
BILIRUB SERPL-MCNC: 0.3 MG/DL (ref 0–1.2)
BUN SERPL-MCNC: 17 MG/DL (ref 6–23)
CALCIUM SERPL-MCNC: 9.2 MG/DL (ref 8.6–10.3)
CHLORIDE SERPL-SCNC: 103 MMOL/L (ref 98–107)
CHOLEST SERPL-MCNC: 232 MG/DL (ref 0–199)
CHOLESTEROL/HDL RATIO: 3.3
CO2 SERPL-SCNC: 28 MMOL/L (ref 21–32)
CREAT SERPL-MCNC: 0.79 MG/DL (ref 0.5–1.05)
CREAT UR-MCNC: 202.5 MG/DL (ref 20–320)
EGFRCR SERPLBLD CKD-EPI 2021: 82 ML/MIN/1.73M*2
EST. AVERAGE GLUCOSE BLD GHB EST-MCNC: 146 MG/DL
GLUCOSE SERPL-MCNC: 153 MG/DL (ref 74–99)
HBA1C MFR BLD: 6.7 %
HDLC SERPL-MCNC: 71 MG/DL
LDLC SERPL CALC-MCNC: 140 MG/DL
MICROALBUMIN UR-MCNC: 13 MG/L
MICROALBUMIN/CREAT UR: 6.4 UG/MG CREAT
NON HDL CHOLESTEROL: 161 MG/DL (ref 0–149)
POTASSIUM SERPL-SCNC: 4.5 MMOL/L (ref 3.5–5.3)
PROT SERPL-MCNC: 6.9 G/DL (ref 6.4–8.2)
SODIUM SERPL-SCNC: 141 MMOL/L (ref 136–145)
TRIGL SERPL-MCNC: 105 MG/DL (ref 0–149)
VLDL: 21 MG/DL (ref 0–40)

## 2024-03-13 PROCEDURE — 80061 LIPID PANEL: CPT

## 2024-03-13 PROCEDURE — 82043 UR ALBUMIN QUANTITATIVE: CPT

## 2024-03-13 PROCEDURE — 80053 COMPREHEN METABOLIC PANEL: CPT

## 2024-03-13 PROCEDURE — 83036 HEMOGLOBIN GLYCOSYLATED A1C: CPT

## 2024-03-13 PROCEDURE — 36415 COLL VENOUS BLD VENIPUNCTURE: CPT

## 2024-03-13 PROCEDURE — 82570 ASSAY OF URINE CREATININE: CPT

## 2024-03-14 ENCOUNTER — APPOINTMENT (OUTPATIENT)
Dept: PRIMARY CARE | Facility: CLINIC | Age: 69
End: 2024-03-14
Payer: MEDICARE

## 2024-03-18 DIAGNOSIS — M50.20 COMPRESSED CERVICAL DISC: ICD-10-CM

## 2024-03-18 RX ORDER — GABAPENTIN 300 MG/1
CAPSULE ORAL
Qty: 240 CAPSULE | Refills: 3 | Status: SHIPPED | OUTPATIENT
Start: 2024-03-18 | End: 2024-03-20 | Stop reason: SDUPTHER

## 2024-03-20 ENCOUNTER — HOSPITAL ENCOUNTER (OUTPATIENT)
Dept: RADIOLOGY | Facility: HOSPITAL | Age: 69
Discharge: HOME | End: 2024-03-20
Payer: MEDICARE

## 2024-03-20 ENCOUNTER — OFFICE VISIT (OUTPATIENT)
Dept: PRIMARY CARE | Facility: CLINIC | Age: 69
End: 2024-03-20
Payer: MEDICARE

## 2024-03-20 VITALS
DIASTOLIC BLOOD PRESSURE: 72 MMHG | BODY MASS INDEX: 17.48 KG/M2 | HEART RATE: 102 BPM | HEIGHT: 65 IN | WEIGHT: 104.9 LBS | SYSTOLIC BLOOD PRESSURE: 129 MMHG

## 2024-03-20 DIAGNOSIS — I70.0 ATHEROSCLEROSIS OF AORTA (CMS-HCC): ICD-10-CM

## 2024-03-20 DIAGNOSIS — E46 PROTEIN-CALORIE MALNUTRITION, UNSPECIFIED SEVERITY (MULTI): ICD-10-CM

## 2024-03-20 DIAGNOSIS — E11.9 TYPE 2 DIABETES MELLITUS WITHOUT COMPLICATION, WITHOUT LONG-TERM CURRENT USE OF INSULIN (MULTI): ICD-10-CM

## 2024-03-20 DIAGNOSIS — G89.29 CHRONIC BILATERAL THORACIC BACK PAIN: ICD-10-CM

## 2024-03-20 DIAGNOSIS — I10 PRIMARY HYPERTENSION: ICD-10-CM

## 2024-03-20 DIAGNOSIS — M54.6 CHRONIC BILATERAL THORACIC BACK PAIN: ICD-10-CM

## 2024-03-20 DIAGNOSIS — E78.2 MIXED HYPERLIPIDEMIA: ICD-10-CM

## 2024-03-20 DIAGNOSIS — Z78.9 STATIN INTOLERANCE: ICD-10-CM

## 2024-03-20 DIAGNOSIS — Z00.00 PHYSICAL EXAM: ICD-10-CM

## 2024-03-20 DIAGNOSIS — F17.210 CIGARETTE SMOKER: ICD-10-CM

## 2024-03-20 DIAGNOSIS — M50.20 COMPRESSED CERVICAL DISC: ICD-10-CM

## 2024-03-20 DIAGNOSIS — K64.9 HEMORRHOIDS, UNSPECIFIED HEMORRHOID TYPE: ICD-10-CM

## 2024-03-20 DIAGNOSIS — J43.2 CENTRILOBULAR EMPHYSEMA (MULTI): ICD-10-CM

## 2024-03-20 DIAGNOSIS — Z00.00 MEDICARE ANNUAL WELLNESS VISIT, SUBSEQUENT: Primary | ICD-10-CM

## 2024-03-20 DIAGNOSIS — I65.29 STENOSIS OF CAROTID ARTERY, UNSPECIFIED LATERALITY: ICD-10-CM

## 2024-03-20 PROCEDURE — 99214 OFFICE O/P EST MOD 30 MIN: CPT | Performed by: INTERNAL MEDICINE

## 2024-03-20 PROCEDURE — 3044F HG A1C LEVEL LT 7.0%: CPT | Performed by: INTERNAL MEDICINE

## 2024-03-20 PROCEDURE — 72100 X-RAY EXAM L-S SPINE 2/3 VWS: CPT | Performed by: RADIOLOGY

## 2024-03-20 PROCEDURE — 3061F NEG MICROALBUMINURIA REV: CPT | Performed by: INTERNAL MEDICINE

## 2024-03-20 PROCEDURE — 1159F MED LIST DOCD IN RCRD: CPT | Performed by: INTERNAL MEDICINE

## 2024-03-20 PROCEDURE — 3078F DIAST BP <80 MM HG: CPT | Performed by: INTERNAL MEDICINE

## 2024-03-20 PROCEDURE — 72072 X-RAY EXAM THORAC SPINE 3VWS: CPT | Performed by: RADIOLOGY

## 2024-03-20 PROCEDURE — 3074F SYST BP LT 130 MM HG: CPT | Performed by: INTERNAL MEDICINE

## 2024-03-20 PROCEDURE — 99397 PER PM REEVAL EST PAT 65+ YR: CPT | Performed by: INTERNAL MEDICINE

## 2024-03-20 PROCEDURE — G0439 PPPS, SUBSEQ VISIT: HCPCS | Performed by: INTERNAL MEDICINE

## 2024-03-20 PROCEDURE — 4010F ACE/ARB THERAPY RXD/TAKEN: CPT | Performed by: INTERNAL MEDICINE

## 2024-03-20 PROCEDURE — 72072 X-RAY EXAM THORAC SPINE 3VWS: CPT

## 2024-03-20 PROCEDURE — 72100 X-RAY EXAM L-S SPINE 2/3 VWS: CPT

## 2024-03-20 PROCEDURE — 1160F RVW MEDS BY RX/DR IN RCRD: CPT | Performed by: INTERNAL MEDICINE

## 2024-03-20 PROCEDURE — 3050F LDL-C >= 130 MG/DL: CPT | Performed by: INTERNAL MEDICINE

## 2024-03-20 PROCEDURE — 1157F ADVNC CARE PLAN IN RCRD: CPT | Performed by: INTERNAL MEDICINE

## 2024-03-20 RX ORDER — GABAPENTIN 300 MG/1
CAPSULE ORAL
Qty: 672 CAPSULE | Refills: 3 | Status: SHIPPED | OUTPATIENT
Start: 2024-03-20 | End: 2024-03-20 | Stop reason: SDUPTHER

## 2024-03-20 RX ORDER — GABAPENTIN 300 MG/1
CAPSULE ORAL
Qty: 720 CAPSULE | Refills: 3 | Status: SHIPPED | OUTPATIENT
Start: 2024-03-20

## 2024-03-20 NOTE — PROGRESS NOTES
Chief Complaint: Medicare Wellness Exam/Comprehensive Problem Focused Follow Up and Physical Exam    HPI:    Patient is here today for MWV.     Pt reports that she has been having worsening thoracic back pain, she stopped going to pain management because the injections did not help.       Active Problem List  Patient Active Problem List   Diagnosis    Abnormal CT scan, chest    Carotid artery stenosis    Compressed cervical disc    Cigarette smoker    Diabetes (CMS/HCC)    COPD (chronic obstructive pulmonary disease) (CMS/[facility])    Mixed hyperlipidemia    HTN (hypertension)    Dizziness    Statin intolerance    Protein-calorie malnutrition, unspecified severity (CMS/[facility])    Atherosclerosis of aorta (CMS/[facility])       Comprehensive Medical/Surgical/Social/Family History  Past Medical History:   Diagnosis Date    Asymptomatic menopausal state     History of menopause    Encounter for full-term uncomplicated delivery     Normal vaginal delivery    Encounter for gynecological examination (general) (routine) without abnormal findings 11/05/2021    Pap test, as part of routine gynecological examination    Other conditions influencing health status     Menstruation    Personal history of other medical treatment     History of screening mammography     Past Surgical History:   Procedure Laterality Date    MR HEAD ANGIO WO IV CONTRAST  07/05/2023    MR HEAD ANGIO WO IV CONTRAST 7/5/2023 [facility]    MR NECK ANGIO WO IV CONTRAST  07/05/2023    MR NECK ANGIO WO IV CONTRAST 7/5/2023 [facility]    OTHER SURGICAL HISTORY  03/05/2020    Neck surgery    OTHER SURGICAL HISTORY  03/05/2020    Cataract surgery    OTHER SURGICAL HISTORY  03/05/2020    Colonoscopy    OTHER SURGICAL HISTORY  12/23/2022    Epidural steroid injection    OTHER SURGICAL HISTORY  10/20/2022    Radiofrequency ablation    OTHER SURGICAL HISTORY  06/08/2022    Radiofrequency ablation    OTHER SURGICAL HISTORY  06/24/2022    Medial branch block    OTHER SURGICAL HISTORY   09/01/2022    Medial branch block    OTHER SURGICAL HISTORY  03/03/2022    Medial branch block    OTHER SURGICAL HISTORY  03/30/2022    Medial branch block    OTHER SURGICAL HISTORY  10/27/2020    Esophagogastroduodenoscopy    OTHER SURGICAL HISTORY  10/27/2020    Condyloma acuminata excision    OTHER SURGICAL HISTORY  08/19/2021    Epidural steroid injection    TUBAL LIGATION Bilateral 1980     Social History     Tobacco Use    Smoking status: Every Day     Packs/day: .5     Types: Cigarettes    Smokeless tobacco: Never   Vaping Use    Vaping Use: Never used   Substance Use Topics    Alcohol use: Never    Drug use: Never     Family History   Problem Relation Name Age of Onset    Diabetes Mother      Hypertension Mother      Heart attack Mother      Diabetes Father      Heart attack Father           Allergies and Medications  House dust and Baclofen  Current Outpatient Medications on File Prior to Visit   Medication Sig Dispense Refill    aclidinium (Tudorza) 400 mcg/actuation inhaler Inhale 1 puff 2 times a day.      albuterol 2.5 mg /3 mL (0.083 %) nebulizer solution Inhale.      albuterol 90 mcg/actuation inhaler INHALE 1 (ONE) puff BY MOUTH EVERY 4 HOURS AS NEEDED 25.5 g 5    alendronate (Fosamax) 70 mg tablet Take 1 tablet (70 mg) by mouth every 7 days. Take in the morning with a full glass of water, on an empty stomach, and do not take anything else by mouth or lie down for the next 30 min. 12 tablet 3    estradiol (Estrace) 0.01 % (0.1 mg/gram) vaginal cream Insert into the vagina.      ezetimibe (Zetia) 10 mg tablet Take 1 tablet (10 mg) by mouth once daily. 90 tablet 3    fluticasone (Flonase) 50 mcg/actuation nasal spray Administer 2 sprays into affected nostril(s).      FreeStyle Lite Strips strip TEST BLOOD SUGARS 1-2 TIMES PER DAY      hydrOXYzine HCL (Atarax) 25 mg tablet Take every 6 hours prn dizziness 45 tablet 0    loratadine (Claritin) 10 mg tablet Take 1 tablet (10 mg) by mouth once daily.       medroxyPROGESTERone (Provera) 10 mg tablet TAKE 1 TABLET BY MOUTH EVERY DAY 90 tablet 5    melatonin 10 mg capsule Take 1 capsule (10 mg) by mouth once daily at bedtime.      meloxicam (Mobic) 15 mg tablet Take 0.5 tablets (7.5 mg) by mouth once daily.      metFORMIN  mg 24 hr tablet Take 2 tablets (1,000 mg) by mouth 2 times a day with meals. 360 tablet 3    valsartan (Diovan) 40 mg tablet Take 1 tablet (40 mg) by mouth once daily. 90 tablet 3    [DISCONTINUED] gabapentin (Neurontin) 300 mg capsule TAKE 2 CAPS Po IN THE MORNING, 2 CAPS po in THE AFTERNOON, and 3-4 CAPS po AT BEDTIME 240 capsule 3    [DISCONTINUED] gabapentin (Neurontin) 300 mg capsule TAKE 2 CAPS Po IN THE MORNING, 2 CAPS po in THE AFTERNOON, and 3-4 CAPS po AT BEDTIME 240 capsule 3     No current facility-administered medications on file prior to visit.       Medicare Wellness Questionnaire        How have you been on Medicare less than a year ? Yes  Have you had a Medicare Wellness exam before ? Yes  Have you had any surgeries in the last year ? No  Have you developed any new diseases in the last year ? No  Have any close family members developed new diseases in the last year ? No  Have you been to a the hospital in the last year ? No  Do you take any pills or supplements other than those prescribed for you ? Yes  Do you take any opiates for pain such as Tramadol, Percocet or Norco ? No  How do you consider your overall health ?Good  Have you ever used tobacco products ? Yes   Have you smoked more than 100 cigarettes in your life ?  Yes  What form of tobacco have you used ? Cigarettes  How many packs per day ? 1 ppd  How many years ? 50 plus   Have you quit ? No  Would you like to quit ? No  Do you drink alcohol ?  No   Have you ever used illegal drugs at anytime in your life including Marijuana ? No   Which of the following describes your diet ?Well balanced  How many days per week on average do you exercise ? 0 days  Do you have any loss  "of hearing ? No  Do you have hearing aids ? No  Have you or others noted you have loss of memory ? No  Do you need someone to assist you with any of the following ? None   Do you need someone to assist you with any of the following ?none   Have you fallen in the last 6 months ? Yes  Do you have any of the following in your house ?  None   Do you have a living will ? No   Do you have a durable power of  for health care decisions ? Yes    Medications and Supplements  prescribed by me and other practitioners or clinical pharmacist (such as prescriptions, OTC's, herbal therapies and supplements) were reviewed and documented in the medical record.    Tobacco/Alcohol/Opioid use, as well as Illicit Drug Use was screened for/reviewed and documented in Social History section and medication list as appropriate  Activities of Daily Living  In your present state of health, do you have any difficulty performing the following activities?:   Preparing food and eating?: No  Bathing yourself: No  Getting dressed: No  Using the toilet:No  Moving around from place to place: No  In the past year have you fallen or had a near fall?:No    Depression Screen  (Note: if answer to either of the following is \"Yes\", then a more complete depression screening is indicated)   Q1: Over the past two weeks, have you felt down, depressed or hopeless?   No   Q2: Over the past two weeks, have you felt little interest or pleasure in doing things?   no    Current exercise habits: The patient does not participate in regular exercise at present.   Dietary issues discussed: Yes  Hearing difficulties: No  Safe in current home environment: yes  Visual Acuity assessed: no  Cognitive Impairment assessed: yes       Advance directives  Advanced Care Planning (including a Living Will, Healthcare POA, as well as specific end of life choices and/or directives), was discussed for approximately 1minutes with the patient and/or surrogate, voluntarily, and " "documented in the medical record.     Cardiac Risk Assessment  Cardiovascular risk was discussed and, if needed, lifestyle modifications recommended, including nutritional choices, exercise, and elimination of habits contributing to risk. We agreed on a plan to reduce the current cardiovascular risk based on above discussion as needed.  Aspirin use/disuse was discussed after reviewing the updated guidelines below:    Consider low dose Aspirin ( mg) use if the benefit for cardiovascular disease prevention outweighs risk for bleeding complications.   In general, low dose ASA should be considered:  In patients WITHOUT prior MI/stroke/PAD (primary prevention):   a. Age <60: Use if 10-year cardiovascular disease risk >20%, with discussion of risks and benefits with patient  b. Age 60-<70: Use if 10-year cardiovascular disease risk >20% and low bleeding (e.g., gastrointenstinal) risk, with discussion of risks and benefits with patient  c. Age >=70: Do not use    In patients WITH prior MI/stroke/PAD (secondary prevention):   Generally use unless extremely high bleeding (e.g., gastrointenstinal) risk, with discussion of risks and benefits with patient    ROS otherwise negative aside from what was mentioned above in HPI.    Vitals  /72   Pulse 102   Ht 1.651 m (5' 5\")   Wt 47.6 kg (104 lb 14.4 oz)   BMI 17.46 kg/m²   Body mass index is 17.46 kg/m².  Physical Exam  Gen: Alert, NAD, thin  HEENT:  PERRLA, EOMI, conjunctiva and sclera normal in appearance.   Neck:  Supple with FROM; No masses/nodes palpable; Thyroid nontender and without nodules; No ALFREDO  Respiratory:  Lungs CTAB  Cardiovascular:  Heart RRR. No M/R/G. Peripheral pulses equal bilaterally  Abdomen:  Soft, nontender, BS present throughout; No R/G/R; No HSM or masses palpated  Extremities:  FROM all extremities; Muscle strength grossly normal with good tone, tenderness over thoracic-lumbar junction, worse on the right   Neuro:  CN II-XII intact; " Reflexes 2+/2+; Gross motor and sensory intact  Skin:  No suspicious lesions present    Immunizations   Flu shot 2023   COVID received   Pna received   Shingles received   RSV received     Mammo 12/23   Pap na  DEXA 10/23  Colonscopy 2019   Assessment and Plan:  Problem List Items Addressed This Visit       Carotid artery stenosis    Compressed cervical disc    Relevant Medications    gabapentin (Neurontin) 300 mg capsule    Cigarette smoker    Diabetes (CMS/HCC)    Relevant Orders    Hemoglobin A1C    Lipid Panel    Comprehensive Metabolic Panel    COPD (chronic obstructive pulmonary disease) (CMS/[facility])    Mixed hyperlipidemia    HTN (hypertension)    Statin intolerance    Protein-calorie malnutrition, unspecified severity (CMS/HCC)    Relevant Orders    TSH with reflex to Free T4 if abnormal    Atherosclerosis of aorta (CMS/[facility])     Other Visit Diagnoses       Chronic bilateral thoracic back pain    -  Primary    Relevant Orders    XR thoracic spine 3 views    XR lumbar spine 2-3 views    Hemorrhoids, unspecified hemorrhoid type        Relevant Orders    Referral to General Surgery            1. Recurrent dysuria, saw Dr Mata  - on Urogesiv  - has not had any more urinary tract infections      2. COPD  - continue albuterol prn    - continue tudorza      3. Chronic back pain  - continue gabapentin  - continue tizanidine   - stopped seeing pain management because injections were not helping     will update xrays, consider doing mri     4. HTN   - continue valsartan, will have her cut in half for a week and see if dizziness improves     5. DMII   - continue metformin   - A1c 6.7%      6. HLD   - chol higher off statin, did not tolerate muscle pain, continue zetia      - ldl 140         7. Hemorrhoids   - will refer to Dr Resendiz      8. BMI 17   - weight stable 104, was 108 at last appt     During the course of the visit the patient was educated and counseled about age appropriate screening and preventive services.  Completed preventive screenings were documented in the chart and orders were placed for outstanding screenings/procedures as documented in the Assessment and Plan.      Patient Instructions (the written plan) was given to the patient at check out.      Hyacinth Harper DO

## 2024-04-22 ENCOUNTER — APPOINTMENT (OUTPATIENT)
Dept: SURGERY | Facility: CLINIC | Age: 69
End: 2024-04-22
Payer: MEDICARE

## 2024-04-23 ENCOUNTER — PREP FOR PROCEDURE (OUTPATIENT)
Dept: SURGERY | Facility: CLINIC | Age: 69
End: 2024-04-23

## 2024-04-23 ENCOUNTER — OFFICE VISIT (OUTPATIENT)
Dept: SURGERY | Facility: CLINIC | Age: 69
End: 2024-04-23
Payer: MEDICARE

## 2024-04-23 VITALS
BODY MASS INDEX: 17.9 KG/M2 | HEIGHT: 65 IN | WEIGHT: 107.4 LBS | HEART RATE: 100 BPM | SYSTOLIC BLOOD PRESSURE: 132 MMHG | DIASTOLIC BLOOD PRESSURE: 70 MMHG

## 2024-04-23 DIAGNOSIS — K64.9 HEMORRHOIDS, UNSPECIFIED HEMORRHOID TYPE: ICD-10-CM

## 2024-04-23 DIAGNOSIS — K62.5 BRBPR (BRIGHT RED BLOOD PER RECTUM): Primary | ICD-10-CM

## 2024-04-23 PROCEDURE — 1157F ADVNC CARE PLAN IN RCRD: CPT | Performed by: SURGERY

## 2024-04-23 PROCEDURE — 99203 OFFICE O/P NEW LOW 30 MIN: CPT | Performed by: SURGERY

## 2024-04-23 PROCEDURE — 4010F ACE/ARB THERAPY RXD/TAKEN: CPT | Performed by: SURGERY

## 2024-04-23 PROCEDURE — 3075F SYST BP GE 130 - 139MM HG: CPT | Performed by: SURGERY

## 2024-04-23 PROCEDURE — 3061F NEG MICROALBUMINURIA REV: CPT | Performed by: SURGERY

## 2024-04-23 PROCEDURE — 3044F HG A1C LEVEL LT 7.0%: CPT | Performed by: SURGERY

## 2024-04-23 PROCEDURE — 1159F MED LIST DOCD IN RCRD: CPT | Performed by: SURGERY

## 2024-04-23 PROCEDURE — 1160F RVW MEDS BY RX/DR IN RCRD: CPT | Performed by: SURGERY

## 2024-04-23 PROCEDURE — 3078F DIAST BP <80 MM HG: CPT | Performed by: SURGERY

## 2024-04-23 PROCEDURE — 3050F LDL-C >= 130 MG/DL: CPT | Performed by: SURGERY

## 2024-04-23 NOTE — H&P (VIEW-ONLY)
General Surgery Consultation    Patient: Nanette Elliott  : 1955  MRN: [id number]  Date of Consultation: 24    Referring Primary Care Provider: Hyacinth Harper DO    Chief Complaint: Blood per rectum    History of Present Illness: Nanette Elliott is a 68 y.o. old female seen at the request of Dr. Harper for evaluation of hemorrhoids.  For the past year she has been seeing blood with bowel movements.  She has bowel movements once daily, sometimes twice in a day.  She is not on any stool softeners, fiber supplements, or laxatives.  However, her bowel movements significantly vary in consistency.  About twice per week these require straining.  She sits on the toilet for up to 20 minutes at a time when more constipated.  Other times, she has loose bowel movements.  She has also been having some fecal incontinence, usually if the bowel movement is loose, but once or twice she has had incontinence of formed stool.  She sees bright red blood on tissue paper with wiping with almost every bowel movement.  This is never dripping into the toilet bowl.  She reports a throbbing type of perianal pain and reports feeling as though she is sitting on her hemorrhoids.  She occasionally has tissue prolapse from the rectum but this spontaneously reduces. She takes Mobic, but no other blood thinners or antiplatelet agents.  Her last colonoscopy was 3/15/2019 by Dr. Barrett.  She had diverticular disease, but this was otherwise a normal colonoscopy.  5-year surveillance was recommended.  Of note, she required 100 mcg of fentanyl and 10 mg of Versed for that procedure.  It was noted to be difficult due to tortuosity and also required manual pressure.  She has no family history of colon or rectal cancer or inflammatory bowel disease.    Medical History:  Carotid artery stenosis  Diabetes  COPD  Hyperlipidemia  Hypertension  Malnutrition    Surgical History:  Colonoscopy, 3/5/2019 by Dr. Barrett, diverticular disease  but otherwise normal.  5-year surveillance recommended.   Cataract surgery  Back surgery  Excision of condyloma acuminata, October 2020  Tubal ligation    Home Medications:  Prior to Admission medications    Medication Sig Start Date End Date Taking? Authorizing Provider   aclidinium (Tudorza) 400 mcg/actuation inhaler Inhale 1 puff 2 times a day.    Historical Provider, MD   albuterol 2.5 mg /3 mL (0.083 %) nebulizer solution Inhale. 5/27/22   Historical Provider, MD   albuterol 90 mcg/actuation inhaler INHALE 1 (ONE) puff BY MOUTH EVERY 4 HOURS AS NEEDED 3/5/24   Hyacinth Harper DO   alendronate (Fosamax) 70 mg tablet Take 1 tablet (70 mg) by mouth every 7 days. Take in the morning with a full glass of water, on an empty stomach, and do not take anything else by mouth or lie down for the next 30 min. 10/27/23 10/26/24  Hyacinth Harper DO   estradiol (Estrace) 0.01 % (0.1 mg/gram) vaginal cream Insert into the vagina. 12/15/22   Historical Provider, MD   ezetimibe (Zetia) 10 mg tablet Take 1 tablet (10 mg) by mouth once daily. 6/5/23   Hyacinth Harper DO   fluticasone (Flonase) 50 mcg/actuation nasal spray Administer 2 sprays into affected nostril(s).    Historical Provider, MD   FreeStyle Lite Strips strip TEST BLOOD SUGARS 1-2 TIMES PER DAY 3/29/19   Historical Provider, MD   gabapentin (Neurontin) 300 mg capsule TAKE 2 CAPS Po IN THE MORNING, 2 CAPS po in THE AFTERNOON, and 3-4 CAPS po AT BEDTIME 3/20/24   Hyacinth Harper DO   hydrOXYzine HCL (Atarax) 25 mg tablet Take every 6 hours prn dizziness 7/18/23   Marcos Roldan PA-C   loratadine (Claritin) 10 mg tablet Take 1 tablet (10 mg) by mouth once daily.    Historical Provider, MD   medroxyPROGESTERone (Provera) 10 mg tablet TAKE 1 TABLET BY MOUTH EVERY DAY 3/5/24   Hyacinth Harper DO   melatonin 10 mg capsule Take 1 capsule (10 mg) by mouth once daily at bedtime.    Historical Provider, MD   meloxicam (Mobic) 15 mg tablet Take 0.5 tablets  "(7.5 mg) by mouth once daily.    Historical Provider, MD   metFORMIN  mg 24 hr tablet Take 2 tablets (1,000 mg) by mouth 2 times a day with meals. 2/29/24   Hyacinth Harper DO   valsartan (Diovan) 40 mg tablet Take 1 tablet (40 mg) by mouth once daily. 2/29/24   Hyacinth Harper DO     Allergies:  is allergic to house dust and baclofen.    Family History:   No family history of colon or rectal cancer or inflammatory bowel disease.  Mother with hypertension.  Both parents with diabetes.    Social History:  .  Smokes a pack of cigarettes daily.  No alcohol or drug use.    ROS:  Constitutional: + Weight loss, fatigue  Cardiovascular: No chest pain, + swelling in ankles  Respiratory: No cough or shortness of breath  Gastrointestinal: + BRBPR, occasional fecal incontinence  Genitourinary: no dysuria, menopause at age 51  Musculoskeletal: + Back pain/neck pain, arthritis, joint pain/stiffness  Integumentary: no jaundice  Neurological: + Short-term memory loss  Endocrine: no heat or cold intolerance  Heme/Lymph: no easy bruising or bleeding    Objective:  /70   Pulse 100   Ht 1.651 m (5' 5\")   Wt 48.7 kg (107 lb 6.4 oz)   BMI 17.87 kg/m²     Physical Exam:  Constitutional: No acute distress, conversant, pleasant  Neurologic: alert and oriented  Psych: appropriate affect  Ears, Nose, Mouth and Throat: mucus membranes moist  Pulmonary: No labored breathing  Cardiovascular: Regular rate and rhythm  Abdomen: Nondistended, BMI 17.5  Rectal: She has a small nonthrombosed and noninflamed external hemorrhoidal skin tag in the right posterior location.  No other external perianal abnormalities.  She has a palpable fullness on digital rectal exam, but no discrete masses, normal tone.  Musculoskeletal: Moves all extremities, no edema  Skin: no jaundice    Procedure:  Lighted anoscopy was performed in the prone jackknife position with the assistance of my medical assistant, Alexa.  This revealed " moderately enlarged hemorrhoids.  The largest of these columns was the right posterior.  These were not friable and did not bleed on contact with the scope.  Given that these were not overly impressive on anoscopy, I then had the patient go to the restroom and strain to try to prolapse the tissue that she occasionally sees.  These were in 2 discrete columns, left anterior and right posterior, consistent with hemorrhoids.  No evidence of rectal prolapse.    Labs:  Labs from 3/13/2024 reviewed: LFTs within normal limits  Hgb 12.4 in July 2023    Imaging:  No pertinent imaging available for review.    Assessment and Plan: Nanette Elliott is a 68 y.o. old female with grade 2 bleeding and prolapsing internal hemorrhoids.  We discussed that the long-term treatment of hemorrhoids is keeping bowel movements soft and regular and avoiding straining.  I have recommended that she start taking daily fiber supplementation.  I have also recommended colonoscopy since she is due for one anyway and has now seen blood per rectum.  We discussed the risks of this procedure.  This included risks of bleeding, perforation, missed polyps, incomplete colonoscopy, and potential need for additional procedures pending findings.  The patient was agreeable to proceed.  Bowel prep instructions were reviewed and all questions were answered.  The patient is scheduled for colonoscopy on 5/8/24.  We will do this in the OR with the assistance of Anesthesiology given report of previous difficult colonoscopy due to tortuosity of the colon.  I will see her back after colonoscopy for possible hemorrhoid banding.    Hyacinth Reesndiz MD  4/23/2024

## 2024-04-23 NOTE — LETTER
2024     Hyacinth Harper DO  [address]  [facility] [id number]    Patient: Nanette Elliott   YOB: 1955   Date of Visit: 2024       Dear Dr. Hyacinth Harper DO:    Thank you for referring Nanette Elliott to me for evaluation. Below are my notes for this consultation.  If you have questions, please do not hesitate to call me. I look forward to following your patient along with you.       Sincerely,     Hyacinth Resendiz MD      CC: No Recipients  ______________________________________________________________________________________    General Surgery Consultation    Patient: Nanette Elliott  : 1955  MRN: [id number]  Date of Consultation: 24    Referring Primary Care Provider: Hyacinth Harper DO    Chief Complaint: Blood per rectum    History of Present Illness: Nanette Elliott is a 68 y.o. old female seen at the request of Dr. Harper for evaluation of hemorrhoids.  For the past year she has been seeing blood with bowel movements.  She has bowel movements once daily, sometimes twice in a day.  She is not on any stool softeners, fiber supplements, or laxatives.  However, her bowel movements significantly vary in consistency.  About twice per week these require straining.  She sits on the toilet for up to 20 minutes at a time when more constipated.  Other times, she has loose bowel movements.  She has also been having some fecal incontinence, usually if the bowel movement is loose, but once or twice she has had incontinence of formed stool.  She sees bright red blood on tissue paper with wiping with almost every bowel movement.  This is never dripping into the toilet bowl.  She reports a throbbing type of perianal pain and reports feeling as though she is sitting on her hemorrhoids.  She occasionally has tissue prolapse from the rectum but this spontaneously reduces. She takes Mobic, but no other blood thinners or antiplatelet agents.  Her  last colonoscopy was 3/15/2019 by Dr. Barrett.  She had diverticular disease, but this was otherwise a normal colonoscopy.  5-year surveillance was recommended.  Of note, she required 100 mcg of fentanyl and 10 mg of Versed for that procedure.  It was noted to be difficult due to tortuosity and also required manual pressure.  She has no family history of colon or rectal cancer or inflammatory bowel disease.    Medical History:  Carotid artery stenosis  Diabetes  COPD  Hyperlipidemia  Hypertension  Malnutrition    Surgical History:  Colonoscopy, 3/5/2019 by Dr. Barrett, diverticular disease but otherwise normal.  5-year surveillance recommended.   Cataract surgery  Back surgery  Excision of condyloma acuminata, October 2020  Tubal ligation    Home Medications:  Prior to Admission medications    Medication Sig Start Date End Date Taking? Authorizing Provider   aclidinium (Tudorza) 400 mcg/actuation inhaler Inhale 1 puff 2 times a day.    Historical Provider, MD   albuterol 2.5 mg /3 mL (0.083 %) nebulizer solution Inhale. 5/27/22   Historical Provider, MD   albuterol 90 mcg/actuation inhaler INHALE 1 (ONE) puff BY MOUTH EVERY 4 HOURS AS NEEDED 3/5/24   Hyacinth Harper, DO   alendronate (Fosamax) 70 mg tablet Take 1 tablet (70 mg) by mouth every 7 days. Take in the morning with a full glass of water, on an empty stomach, and do not take anything else by mouth or lie down for the next 30 min. 10/27/23 10/26/24  Hyacinth Harper DO   estradiol (Estrace) 0.01 % (0.1 mg/gram) vaginal cream Insert into the vagina. 12/15/22   Historical Provider, MD   ezetimibe (Zetia) 10 mg tablet Take 1 tablet (10 mg) by mouth once daily. 6/5/23   Hyacinth Harper DO   fluticasone (Flonase) 50 mcg/actuation nasal spray Administer 2 sprays into affected nostril(s).    Historical Provider, MD   FreeStyle Lite Strips strip TEST BLOOD SUGARS 1-2 TIMES PER DAY 3/29/19   Historical Provider, MD   gabapentin (Neurontin) 300 mg  "capsule TAKE 2 CAPS Po IN THE MORNING, 2 CAPS po in THE AFTERNOON, and 3-4 CAPS po AT BEDTIME 3/20/24   Hyacinth Harper DO   hydrOXYzine HCL (Atarax) 25 mg tablet Take every 6 hours prn dizziness 7/18/23   Marcos Roldan PA-C   loratadine (Claritin) 10 mg tablet Take 1 tablet (10 mg) by mouth once daily.    Historical Provider, MD   medroxyPROGESTERone (Provera) 10 mg tablet TAKE 1 TABLET BY MOUTH EVERY DAY 3/5/24   Hyacinth Harper DO   melatonin 10 mg capsule Take 1 capsule (10 mg) by mouth once daily at bedtime.    Historical Provider, MD   meloxicam (Mobic) 15 mg tablet Take 0.5 tablets (7.5 mg) by mouth once daily.    Historical Provider, MD   metFORMIN  mg 24 hr tablet Take 2 tablets (1,000 mg) by mouth 2 times a day with meals. 2/29/24   Hyacinth Harper DO   valsartan (Diovan) 40 mg tablet Take 1 tablet (40 mg) by mouth once daily. 2/29/24   Hyacinth Harper DO     Allergies:  is allergic to house dust and baclofen.    Family History:   No family history of colon or rectal cancer or inflammatory bowel disease.  Mother with hypertension.  Both parents with diabetes.    Social History:  .  Smokes a pack of cigarettes daily.  No alcohol or drug use.    ROS:  Constitutional: + Weight loss, fatigue  Cardiovascular: No chest pain, + swelling in ankles  Respiratory: No cough or shortness of breath  Gastrointestinal: + BRBPR, occasional fecal incontinence  Genitourinary: no dysuria, menopause at age 51  Musculoskeletal: + Back pain/neck pain, arthritis, joint pain/stiffness  Integumentary: no jaundice  Neurological: + Short-term memory loss  Endocrine: no heat or cold intolerance  Heme/Lymph: no easy bruising or bleeding    Objective:  /70   Pulse 100   Ht 1.651 m (5' 5\")   Wt 48.7 kg (107 lb 6.4 oz)   BMI 17.87 kg/m²     Physical Exam:  Constitutional: No acute distress, conversant, pleasant  Neurologic: alert and oriented  Psych: appropriate affect  Ears, Nose, Mouth and " Throat: mucus membranes moist  Pulmonary: No labored breathing  Cardiovascular: Regular rate and rhythm  Abdomen: Nondistended, BMI 17.5  Rectal: She has a small nonthrombosed and noninflamed external hemorrhoidal skin tag in the right posterior location.  No other external perianal abnormalities.  She has a palpable fullness on digital rectal exam, but no discrete masses, normal tone.  Musculoskeletal: Moves all extremities, no edema  Skin: no jaundice    Procedure:  Lighted anoscopy was performed in the prone jackknife position with the assistance of my medical assistant, Alexa.  This revealed moderately enlarged hemorrhoids.  The largest of these columns was the right posterior.  These were not friable and did not bleed on contact with the scope.  Given that these were not overly impressive on anoscopy, I then had the patient go to the restroom and strain to try to prolapse the tissue that she occasionally sees.  These were in 2 discrete columns, left anterior and right posterior, consistent with hemorrhoids.  No evidence of rectal prolapse.    Labs:  Labs from 3/13/2024 reviewed: LFTs within normal limits  Hgb 12.4 in July 2023    Imaging:  No pertinent imaging available for review.    Assessment and Plan: Nanette Elliott is a 68 y.o. old female with grade 2 bleeding and prolapsing internal hemorrhoids.  We discussed that the long-term treatment of hemorrhoids is keeping bowel movements soft and regular and avoiding straining.  I have recommended that she start taking daily fiber supplementation.  I have also recommended colonoscopy since she is due for one anyway and has now seen blood per rectum.  We discussed the risks of this procedure.  This included risks of bleeding, perforation, missed polyps, incomplete colonoscopy, and potential need for additional procedures pending findings.  The patient was agreeable to proceed.  Bowel prep instructions were reviewed and all questions were answered.  The patient  is scheduled for colonoscopy on 5/8/24.  We will do this in the OR with the assistance of Anesthesiology given report of previous difficult colonoscopy due to tortuosity of the colon.  I will see her back after colonoscopy for possible hemorrhoid banding.    Hyacinth Resendiz MD  4/23/2024

## 2024-04-23 NOTE — PROGRESS NOTES
General Surgery Consultation    Patient: Nanette Elliott  : 1955  MRN: [id number]  Date of Consultation: 24    Referring Primary Care Provider: Hyacinth Harper DO    Chief Complaint: Blood per rectum    History of Present Illness: Nanette Elliott is a 68 y.o. old female seen at the request of Dr. Harper for evaluation of hemorrhoids.  For the past year she has been seeing blood with bowel movements.  She has bowel movements once daily, sometimes twice in a day.  She is not on any stool softeners, fiber supplements, or laxatives.  However, her bowel movements significantly vary in consistency.  About twice per week these require straining.  She sits on the toilet for up to 20 minutes at a time when more constipated.  Other times, she has loose bowel movements.  She has also been having some fecal incontinence, usually if the bowel movement is loose, but once or twice she has had incontinence of formed stool.  She sees bright red blood on tissue paper with wiping with almost every bowel movement.  This is never dripping into the toilet bowl.  She reports a throbbing type of perianal pain and reports feeling as though she is sitting on her hemorrhoids.  She occasionally has tissue prolapse from the rectum but this spontaneously reduces. She takes Mobic, but no other blood thinners or antiplatelet agents.  Her last colonoscopy was 3/15/2019 by Dr. Barrett.  She had diverticular disease, but this was otherwise a normal colonoscopy.  5-year surveillance was recommended.  Of note, she required 100 mcg of fentanyl and 10 mg of Versed for that procedure.  It was noted to be difficult due to tortuosity and also required manual pressure.  She has no family history of colon or rectal cancer or inflammatory bowel disease.    Medical History:  Carotid artery stenosis  Diabetes  COPD  Hyperlipidemia  Hypertension  Malnutrition    Surgical History:  Colonoscopy, 3/5/2019 by Dr. Barrett, diverticular disease  but otherwise normal.  5-year surveillance recommended.   Cataract surgery  Back surgery  Excision of condyloma acuminata, October 2020  Tubal ligation    Home Medications:  Prior to Admission medications    Medication Sig Start Date End Date Taking? Authorizing Provider   aclidinium (Tudorza) 400 mcg/actuation inhaler Inhale 1 puff 2 times a day.    Historical Provider, MD   albuterol 2.5 mg /3 mL (0.083 %) nebulizer solution Inhale. 5/27/22   Historical Provider, MD   albuterol 90 mcg/actuation inhaler INHALE 1 (ONE) puff BY MOUTH EVERY 4 HOURS AS NEEDED 3/5/24   Hyacinth Harper DO   alendronate (Fosamax) 70 mg tablet Take 1 tablet (70 mg) by mouth every 7 days. Take in the morning with a full glass of water, on an empty stomach, and do not take anything else by mouth or lie down for the next 30 min. 10/27/23 10/26/24  Hyacinth Harper DO   estradiol (Estrace) 0.01 % (0.1 mg/gram) vaginal cream Insert into the vagina. 12/15/22   Historical Provider, MD   ezetimibe (Zetia) 10 mg tablet Take 1 tablet (10 mg) by mouth once daily. 6/5/23   Hyacinth Harper DO   fluticasone (Flonase) 50 mcg/actuation nasal spray Administer 2 sprays into affected nostril(s).    Historical Provider, MD   FreeStyle Lite Strips strip TEST BLOOD SUGARS 1-2 TIMES PER DAY 3/29/19   Historical Provider, MD   gabapentin (Neurontin) 300 mg capsule TAKE 2 CAPS Po IN THE MORNING, 2 CAPS po in THE AFTERNOON, and 3-4 CAPS po AT BEDTIME 3/20/24   Hyacinth Harper DO   hydrOXYzine HCL (Atarax) 25 mg tablet Take every 6 hours prn dizziness 7/18/23   Marcos Roldan PA-C   loratadine (Claritin) 10 mg tablet Take 1 tablet (10 mg) by mouth once daily.    Historical Provider, MD   medroxyPROGESTERone (Provera) 10 mg tablet TAKE 1 TABLET BY MOUTH EVERY DAY 3/5/24   Hyacinth Harper DO   melatonin 10 mg capsule Take 1 capsule (10 mg) by mouth once daily at bedtime.    Historical Provider, MD   meloxicam (Mobic) 15 mg tablet Take 0.5 tablets  "(7.5 mg) by mouth once daily.    Historical Provider, MD   metFORMIN  mg 24 hr tablet Take 2 tablets (1,000 mg) by mouth 2 times a day with meals. 2/29/24   Hyacinth Harper DO   valsartan (Diovan) 40 mg tablet Take 1 tablet (40 mg) by mouth once daily. 2/29/24   Hyacinth Harper DO     Allergies:  is allergic to house dust and baclofen.    Family History:   No family history of colon or rectal cancer or inflammatory bowel disease.  Mother with hypertension.  Both parents with diabetes.    Social History:  .  Smokes a pack of cigarettes daily.  No alcohol or drug use.    ROS:  Constitutional: + Weight loss, fatigue  Cardiovascular: No chest pain, + swelling in ankles  Respiratory: No cough or shortness of breath  Gastrointestinal: + BRBPR, occasional fecal incontinence  Genitourinary: no dysuria, menopause at age 51  Musculoskeletal: + Back pain/neck pain, arthritis, joint pain/stiffness  Integumentary: no jaundice  Neurological: + Short-term memory loss  Endocrine: no heat or cold intolerance  Heme/Lymph: no easy bruising or bleeding    Objective:  /70   Pulse 100   Ht 1.651 m (5' 5\")   Wt 48.7 kg (107 lb 6.4 oz)   BMI 17.87 kg/m²     Physical Exam:  Constitutional: No acute distress, conversant, pleasant  Neurologic: alert and oriented  Psych: appropriate affect  Ears, Nose, Mouth and Throat: mucus membranes moist  Pulmonary: No labored breathing  Cardiovascular: Regular rate and rhythm  Abdomen: Nondistended, BMI 17.5  Rectal: She has a small nonthrombosed and noninflamed external hemorrhoidal skin tag in the right posterior location.  No other external perianal abnormalities.  She has a palpable fullness on digital rectal exam, but no discrete masses, normal tone.  Musculoskeletal: Moves all extremities, no edema  Skin: no jaundice    Procedure:  Lighted anoscopy was performed in the prone jackknife position with the assistance of my medical assistant, Alexa.  This revealed " moderately enlarged hemorrhoids.  The largest of these columns was the right posterior.  These were not friable and did not bleed on contact with the scope.  Given that these were not overly impressive on anoscopy, I then had the patient go to the restroom and strain to try to prolapse the tissue that she occasionally sees.  These were in 2 discrete columns, left anterior and right posterior, consistent with hemorrhoids.  No evidence of rectal prolapse.    Labs:  Labs from 3/13/2024 reviewed: LFTs within normal limits  Hgb 12.4 in July 2023    Imaging:  No pertinent imaging available for review.    Assessment and Plan: Nanette Elliott is a 68 y.o. old female with grade 2 bleeding and prolapsing internal hemorrhoids.  We discussed that the long-term treatment of hemorrhoids is keeping bowel movements soft and regular and avoiding straining.  I have recommended that she start taking daily fiber supplementation.  I have also recommended colonoscopy since she is due for one anyway and has now seen blood per rectum.  We discussed the risks of this procedure.  This included risks of bleeding, perforation, missed polyps, incomplete colonoscopy, and potential need for additional procedures pending findings.  The patient was agreeable to proceed.  Bowel prep instructions were reviewed and all questions were answered.  The patient is scheduled for colonoscopy on 5/8/24.  We will do this in the OR with the assistance of Anesthesiology given report of previous difficult colonoscopy due to tortuosity of the colon.  I will see her back after colonoscopy for possible hemorrhoid banding.    Hyacinth Resendiz MD  4/23/2024

## 2024-04-24 ENCOUNTER — DOCUMENTATION (OUTPATIENT)
Dept: SURGERY | Facility: CLINIC | Age: 69
End: 2024-04-24
Payer: MEDICARE

## 2024-04-24 NOTE — PROGRESS NOTES
Notified patient of  Colonoscopy  scheduled on  5-8-24 .Instructions reviewed. Advised patient [facility] will contact them 24 hours before surgery with arrival time. Pt voices understanding. Shonna Rea MA.

## 2024-04-29 ENCOUNTER — TELEPHONE (OUTPATIENT)
Dept: SURGERY | Facility: CLINIC | Age: 69
End: 2024-04-29
Payer: MEDICARE

## 2024-04-29 NOTE — TELEPHONE ENCOUNTER
Patient called in c/o boil on her buttock.  prescribed pt Augmentin. If pt is not better on Wednesday afternoon  we will bring her in to office on Thursday.     Called in Augmentin 875mg/125mg 1 po BID # 14  no refills to [facility]. Spoke with Vibha.

## 2024-05-07 ENCOUNTER — ANESTHESIA EVENT (OUTPATIENT)
Dept: OPERATING ROOM | Facility: HOSPITAL | Age: 69
End: 2024-05-07
Payer: MEDICARE

## 2024-05-08 ENCOUNTER — HOSPITAL ENCOUNTER (OUTPATIENT)
Dept: OPERATING ROOM | Facility: HOSPITAL | Age: 69
Setting detail: OUTPATIENT SURGERY
Discharge: HOME | End: 2024-05-08
Payer: MEDICARE

## 2024-05-08 ENCOUNTER — ANESTHESIA (OUTPATIENT)
Dept: OPERATING ROOM | Facility: HOSPITAL | Age: 69
End: 2024-05-08
Payer: MEDICARE

## 2024-05-08 VITALS
SYSTOLIC BLOOD PRESSURE: 118 MMHG | OXYGEN SATURATION: 96 % | HEART RATE: 73 BPM | TEMPERATURE: 98.5 F | RESPIRATION RATE: 20 BRPM | DIASTOLIC BLOOD PRESSURE: 65 MMHG

## 2024-05-08 DIAGNOSIS — K62.5 BRBPR (BRIGHT RED BLOOD PER RECTUM): ICD-10-CM

## 2024-05-08 PROBLEM — K21.9 GASTROESOPHAGEAL REFLUX DISEASE: Status: ACTIVE | Noted: 2024-05-08

## 2024-05-08 PROBLEM — M54.2 CHRONIC NECK PAIN: Status: ACTIVE | Noted: 2024-05-08

## 2024-05-08 PROBLEM — E11.40 DIABETIC NEUROPATHY ASSOCIATED WITH TYPE 2 DIABETES MELLITUS (MULTI): Status: ACTIVE | Noted: 2024-05-08

## 2024-05-08 PROBLEM — G89.29 CHRONIC NECK PAIN: Status: ACTIVE | Noted: 2024-05-08

## 2024-05-08 LAB — GLUCOSE BLD MANUAL STRIP-MCNC: 148 MG/DL (ref 74–99)

## 2024-05-08 PROCEDURE — 7100000009 HC PHASE TWO TIME - INITIAL BASE CHARGE: Performed by: ANESTHESIOLOGY

## 2024-05-08 PROCEDURE — 88305 TISSUE EXAM BY PATHOLOGIST: CPT | Performed by: STUDENT IN AN ORGANIZED HEALTH CARE EDUCATION/TRAINING PROGRAM

## 2024-05-08 PROCEDURE — 2500000005 HC RX 250 GENERAL PHARMACY W/O HCPCS: Performed by: ANESTHESIOLOGY

## 2024-05-08 PROCEDURE — 2500000004 HC RX 250 GENERAL PHARMACY W/ HCPCS (ALT 636 FOR OP/ED): Performed by: ANESTHESIOLOGY

## 2024-05-08 PROCEDURE — 3600000007 HC OR TIME - EACH INCREMENTAL 1 MINUTE - PROCEDURE LEVEL TWO: Performed by: ANESTHESIOLOGY

## 2024-05-08 PROCEDURE — 88305 TISSUE EXAM BY PATHOLOGIST: CPT | Mod: TC,SUR,SAMLAB | Performed by: SURGERY

## 2024-05-08 PROCEDURE — 3700000001 HC GENERAL ANESTHESIA TIME - INITIAL BASE CHARGE: Performed by: ANESTHESIOLOGY

## 2024-05-08 PROCEDURE — 3700000002 HC GENERAL ANESTHESIA TIME - EACH INCREMENTAL 1 MINUTE: Performed by: ANESTHESIOLOGY

## 2024-05-08 PROCEDURE — 45385 COLONOSCOPY W/LESION REMOVAL: CPT | Performed by: SURGERY

## 2024-05-08 PROCEDURE — 7100000010 HC PHASE TWO TIME - EACH INCREMENTAL 1 MINUTE: Performed by: ANESTHESIOLOGY

## 2024-05-08 PROCEDURE — 3600000002 HC OR TIME - INITIAL BASE CHARGE - PROCEDURE LEVEL TWO: Performed by: ANESTHESIOLOGY

## 2024-05-08 PROCEDURE — 82947 ASSAY GLUCOSE BLOOD QUANT: CPT

## 2024-05-08 PROCEDURE — 2500000004 HC RX 250 GENERAL PHARMACY W/ HCPCS (ALT 636 FOR OP/ED): Mod: JZ | Performed by: SURGERY

## 2024-05-08 RX ORDER — ONDANSETRON HYDROCHLORIDE 2 MG/ML
4 INJECTION, SOLUTION INTRAVENOUS ONCE
Status: COMPLETED | OUTPATIENT
Start: 2024-05-08 | End: 2024-05-08

## 2024-05-08 RX ORDER — FENTANYL CITRATE 50 UG/ML
INJECTION, SOLUTION INTRAMUSCULAR; INTRAVENOUS AS NEEDED
Status: DISCONTINUED | OUTPATIENT
Start: 2024-05-08 | End: 2024-05-08

## 2024-05-08 RX ORDER — MIDAZOLAM HYDROCHLORIDE 2 MG/2ML
INJECTION, SOLUTION INTRAMUSCULAR; INTRAVENOUS AS NEEDED
Status: DISCONTINUED | OUTPATIENT
Start: 2024-05-08 | End: 2024-05-08

## 2024-05-08 RX ORDER — FAMOTIDINE 10 MG/ML
20 INJECTION INTRAVENOUS ONCE
Status: COMPLETED | OUTPATIENT
Start: 2024-05-08 | End: 2024-05-08

## 2024-05-08 RX ORDER — ONDANSETRON HYDROCHLORIDE 2 MG/ML
4 INJECTION, SOLUTION INTRAVENOUS ONCE AS NEEDED
Status: DISCONTINUED | OUTPATIENT
Start: 2024-05-08 | End: 2024-05-09 | Stop reason: HOSPADM

## 2024-05-08 RX ORDER — OXYCODONE HYDROCHLORIDE 5 MG/1
5 TABLET ORAL EVERY 4 HOURS PRN
Status: DISCONTINUED | OUTPATIENT
Start: 2024-05-08 | End: 2024-05-09 | Stop reason: HOSPADM

## 2024-05-08 RX ORDER — PROPOFOL 10 MG/ML
INJECTION, EMULSION INTRAVENOUS CONTINUOUS PRN
Status: DISCONTINUED | OUTPATIENT
Start: 2024-05-08 | End: 2024-05-08

## 2024-05-08 RX ORDER — SODIUM CHLORIDE, SODIUM LACTATE, POTASSIUM CHLORIDE, CALCIUM CHLORIDE 600; 310; 30; 20 MG/100ML; MG/100ML; MG/100ML; MG/100ML
50 INJECTION, SOLUTION INTRAVENOUS CONTINUOUS
Status: DISCONTINUED | OUTPATIENT
Start: 2024-05-08 | End: 2024-05-09 | Stop reason: HOSPADM

## 2024-05-08 RX ORDER — SODIUM CHLORIDE, SODIUM LACTATE, POTASSIUM CHLORIDE, CALCIUM CHLORIDE 600; 310; 30; 20 MG/100ML; MG/100ML; MG/100ML; MG/100ML
100 INJECTION, SOLUTION INTRAVENOUS CONTINUOUS
Status: DISCONTINUED | OUTPATIENT
Start: 2024-05-08 | End: 2024-05-09 | Stop reason: HOSPADM

## 2024-05-08 RX ORDER — MIDAZOLAM HYDROCHLORIDE 1 MG/ML
1 INJECTION INTRAMUSCULAR; INTRAVENOUS ONCE
Status: COMPLETED | OUTPATIENT
Start: 2024-05-08 | End: 2024-05-08

## 2024-05-08 RX ORDER — PROPOFOL 10 MG/ML
INJECTION, EMULSION INTRAVENOUS AS NEEDED
Status: DISCONTINUED | OUTPATIENT
Start: 2024-05-08 | End: 2024-05-08

## 2024-05-08 RX ADMIN — FAMOTIDINE 20 MG: 10 INJECTION, SOLUTION INTRAVENOUS at 06:29

## 2024-05-08 RX ADMIN — ONDANSETRON 4 MG: 2 INJECTION INTRAMUSCULAR; INTRAVENOUS at 06:29

## 2024-05-08 RX ADMIN — Medication 10 MG: at 07:33

## 2024-05-08 RX ADMIN — MIDAZOLAM HYDROCHLORIDE 1 MG: 1 INJECTION, SOLUTION INTRAMUSCULAR; INTRAVENOUS at 07:12

## 2024-05-08 RX ADMIN — SODIUM CHLORIDE, POTASSIUM CHLORIDE, SODIUM LACTATE AND CALCIUM CHLORIDE 50 ML/HR: 600; 310; 30; 20 INJECTION, SOLUTION INTRAVENOUS at 06:28

## 2024-05-08 RX ADMIN — GLUCAGON HYDROCHLORIDE 1 MG: KIT at 07:28

## 2024-05-08 RX ADMIN — FENTANYL CITRATE 50 MCG: 50 INJECTION, SOLUTION INTRAMUSCULAR; INTRAVENOUS at 07:25

## 2024-05-08 RX ADMIN — PROPOFOL 100 MCG/KG/MIN: 10 INJECTION, EMULSION INTRAVENOUS at 07:27

## 2024-05-08 RX ADMIN — Medication 10 MG: at 07:28

## 2024-05-08 RX ADMIN — MIDAZOLAM HYDROCHLORIDE 1 MG: 2 INJECTION, SOLUTION INTRAMUSCULAR; INTRAVENOUS at 07:25

## 2024-05-08 SDOH — HEALTH STABILITY: MENTAL HEALTH: CURRENT SMOKER: 1

## 2024-05-08 ASSESSMENT — PAIN SCALES - GENERAL
PAINLEVEL_OUTOF10: 0 - NO PAIN
PAIN_LEVEL: 0
PAINLEVEL_OUTOF10: 0 - NO PAIN

## 2024-05-08 ASSESSMENT — PAIN - FUNCTIONAL ASSESSMENT: PAIN_FUNCTIONAL_ASSESSMENT: 0-10

## 2024-05-08 NOTE — ANESTHESIA PREPROCEDURE EVALUATION
Patient: Nanette Elliott    Procedure Information       Date/Time: 05/08/24 0730    Scheduled providers: Hyacinth Resendiz MD; Jonah Knott DO; Katerin Robledo RN    Procedure: COLONOSCOPY    Location: [facility] OR            Relevant Problems   Anesthesia   (-) Family history of malignant hyperthermia   (-) Malignant hyperthermia   (-) PONV (postoperative nausea and vomiting)      Cardiac   (+) HTN (hypertension)   (+) Mixed hyperlipidemia      Pulmonary  Smoker 1 ppd.  Occasional inhaler use.   (+) COPD (chronic obstructive pulmonary disease) (Multi)      Neuro   (+) Carotid artery stenosis      GI   (+) Gastroesophageal reflux disease      Liver (within normal limits)      Endocrine   (+) Diabetic neuropathy associated with type 2 diabetes mellitus (Multi)      Musculoskeletal   (+) Chronic neck pain      HEENT (within normal limits)   ASVD  Carotid stenosis 11/23/2020  < 50 %    Clinical information reviewed:   Tobacco  Allergies  Meds   Med Hx  Surg Hx   Fam Hx  Soc Hx        NPO Detail:  NPO/Void Status  Date of Last Liquid: 05/07/24  Time of Last Liquid: 2000  Date of Last Solid: 05/07/24  Time of Last Solid: 1000  Last Intake Type: Clear fluids         Physical Exam    Airway  Mallampati: II  TM distance: >3 FB  Neck ROM: limited     Cardiovascular   Rhythm: regular  Rate: normal     Dental   (+) lower dentures, upper dentures     Pulmonary   Breath sounds clear to auscultation     Abdominal            Anesthesia Plan    History of general anesthesia?: yes  History of complications of general anesthesia?: no    ASA 3     MAC     The patient is a current smoker.  Patient did not smoke on day of procedure.    intravenous induction   Anesthetic plan and risks discussed with patient.    Son at bedside for discussion  MAC  discussed with Patient.  Plan and process discussed for anesthesia for procedure.  Risks and benefits discussed.  All questions answered with patient.  The patient  understands plan and wishes to proceed.

## 2024-05-08 NOTE — ANESTHESIA POSTPROCEDURE EVALUATION
Patient: Nanette Elliott    Procedure Summary       Date: 05/08/24 Room / Location: [facility] OR    Anesthesia Start: 0723 Anesthesia Stop: 0758    Procedure: COLONOSCOPY Diagnosis: BRBPR (bright red blood per rectum)    Scheduled Providers: Hyacinth Resendiz MD; Jonah Knott DO; Katerin Robledo RN Responsible Provider: Jonah Knott DO    Anesthesia Type: MAC ASA Status: 3            Anesthesia Type: MAC    Vitals Value Taken Time   /58 05/08/24 0755   Temp 36.9 °C (98.5 °F) 05/08/24 0755   Pulse 84 05/08/24 0755   Resp 20 05/08/24 0755   SpO2 99 % 05/08/24 0755       Anesthesia Post Evaluation    Patient location during evaluation: bedside  Patient participation: complete - patient participated  Level of consciousness: awake  Pain score: 0  Pain management: adequate  Multimodal analgesia pain management approach  Airway patency: patent  Cardiovascular status: acceptable  Respiratory status: acceptable and face mask  Hydration status: acceptable  Postoperative Nausea and Vomiting: none  Comments: Easily awakens.  VSS.  Denies pain or nausea.        No notable events documented.

## 2024-05-09 ENCOUNTER — TELEPHONE (OUTPATIENT)
Dept: SURGERY | Facility: CLINIC | Age: 69
End: 2024-05-09
Payer: MEDICARE

## 2024-05-09 NOTE — TELEPHONE ENCOUNTER
Spoke to patient after Colonoscopy. Pt denies fever, chills, nausea, and vomiting. Pt had no questions at this time.  Provided office number to patient for any questions Repeat colonoscopy in 5 years.  will call in 2 weeks with pathology results.

## 2024-05-16 ENCOUNTER — TELEPHONE (OUTPATIENT)
Dept: SURGERY | Facility: CLINIC | Age: 69
End: 2024-05-16
Payer: MEDICARE

## 2024-05-16 LAB
LABORATORY COMMENT REPORT: NORMAL
PATH REPORT.FINAL DX SPEC: NORMAL
PATH REPORT.GROSS SPEC: NORMAL
PATH REPORT.TOTAL CANCER: NORMAL

## 2024-05-16 NOTE — TELEPHONE ENCOUNTER
I spoke with the patient over the phone to discuss pathology from recent colonoscopy.  The polyp removed was a tubular adenoma.  This is benign.  She will need a surveillance colonoscopy in 5 years.  I will see her as previously scheduled on 5/30/24 for hemorrhoid banding.    Hyacinth Resendiz MD  05/16/24  4:25 PM

## 2024-05-28 DIAGNOSIS — J01.90 ACUTE NON-RECURRENT SINUSITIS, UNSPECIFIED LOCATION: ICD-10-CM

## 2024-05-28 RX ORDER — AZITHROMYCIN 500 MG/1
500 TABLET, FILM COATED ORAL DAILY
Qty: 5 TABLET | Refills: 0 | Status: SHIPPED | OUTPATIENT
Start: 2024-05-28 | End: 2024-06-02

## 2024-05-30 ENCOUNTER — PROCEDURE VISIT (OUTPATIENT)
Dept: SURGERY | Facility: CLINIC | Age: 69
End: 2024-05-30
Payer: MEDICARE

## 2024-05-30 VITALS
BODY MASS INDEX: 17.83 KG/M2 | DIASTOLIC BLOOD PRESSURE: 74 MMHG | HEIGHT: 65 IN | SYSTOLIC BLOOD PRESSURE: 126 MMHG | WEIGHT: 107 LBS

## 2024-05-30 DIAGNOSIS — K64.8 INTERNAL HEMORRHOIDS: Primary | ICD-10-CM

## 2024-05-30 PROCEDURE — 46221 LIGATION OF HEMORRHOID(S): CPT | Performed by: SURGERY

## 2024-05-30 NOTE — PROGRESS NOTES
General Surgery Follow-up Visit    Patient: Nanette Elliott  : 1955  MRN: [id number]  Date of Visit: 24    Chief Complaint: Blood per rectum    History of Present Illness: Nanette Elliott is a 68 y.o. old female with grade 2 bleeding and prolapsing internal hemorrhoids.  She underwent colonoscopy on 24.  She had a tubular adenoma removed, and will need 5-year surveillance colonoscopy.  She presents today for hemorrhoid banding.  She sees bright red blood on tissue paper with wiping with almost every bowel movement.  This is never dripping into the toilet bowl.  She also wears a pad to protect her clothing and has small amounts of blood on the pad.  She takes Mobic, but no other blood thinners or antiplatelet agents.  She tried taking fiber supplementation but states that this gave her diarrhea.    Medical History:  Carotid artery stenosis  Diabetes  COPD  Hyperlipidemia  Hypertension  Malnutrition     Surgical History:  Colonoscopy, 24, tubular adenoma removed, tortuous sigmoid with diverticular disease, 5-year surveillance recommended.   Cataract surgery  Back surgery  Excision of condyloma acuminata, 2020  Tubal ligation    Home Medications:  Prior to Admission medications    Medication Sig Start Date End Date Taking? Authorizing Provider   aclidinium (Tudorza) 400 mcg/actuation inhaler Inhale 1 puff 2 times a day.    Historical Provider, MD   albuterol 2.5 mg /3 mL (0.083 %) nebulizer solution Inhale. 22   Historical Provider, MD   albuterol 90 mcg/actuation inhaler INHALE 1 (ONE) puff BY MOUTH EVERY 4 HOURS AS NEEDED 3/5/24   Hyacinth Harper DO   alendronate (Fosamax) 70 mg tablet Take 1 tablet (70 mg) by mouth every 7 days. Take in the morning with a full glass of water, on an empty stomach, and do not take anything else by mouth or lie down for the next 30 min. 10/27/23 10/26/24  Hyacinth Harper DO   azithromycin (Zithromax) 500 mg tablet Take 1 tablet (500 mg) by  mouth once daily for 5 days. 5/28/24 6/2/24  Hyacinth Harper DO   estradiol (Estrace) 0.01 % (0.1 mg/gram) vaginal cream Insert into the vagina. 12/15/22   Historical Provider, MD   ezetimibe (Zetia) 10 mg tablet Take 1 tablet (10 mg) by mouth once daily. 6/5/23   Hyacinth Harper DO   fluticasone (Flonase) 50 mcg/actuation nasal spray Administer 2 sprays into affected nostril(s).    Historical Provider, MD   FreeStyle Lite Strips strip TEST BLOOD SUGARS 1-2 TIMES PER DAY 3/29/19   Historical Provider, MD   gabapentin (Neurontin) 300 mg capsule TAKE 2 CAPS Po IN THE MORNING, 2 CAPS po in THE AFTERNOON, and 3-4 CAPS po AT BEDTIME 3/20/24   Hyacinth Harper DO   loratadine (Claritin) 10 mg tablet Take 1 tablet (10 mg) by mouth once daily.    Historical Provider, MD   medroxyPROGESTERone (Provera) 10 mg tablet TAKE 1 TABLET BY MOUTH EVERY DAY 3/5/24   Hyacinth Harper DO   melatonin 10 mg capsule Take 1 capsule (10 mg) by mouth once daily at bedtime.    Historical Provider, MD   metFORMIN  mg 24 hr tablet Take 2 tablets (1,000 mg) by mouth 2 times a day with meals. 2/29/24   Hyacinth Harper DO   valsartan (Diovan) 40 mg tablet Take 1 tablet (40 mg) by mouth once daily. 2/29/24   Hyacinth Harper DO     Allergies:  is allergic to house dust and baclofen.     Family History:   No family history of colon or rectal cancer or inflammatory bowel disease.  Mother with hypertension.  Both parents with diabetes.     Social History:  .  Smokes a pack of cigarettes daily.  No alcohol or drug use.     ROS:  Constitutional: + Weight loss, fatigue  Cardiovascular: No chest pain, + swelling in ankles  Respiratory: No cough or shortness of breath  Gastrointestinal: + BRBPR, occasional fecal incontinence  Genitourinary: no dysuria, menopause at age 51  Musculoskeletal: + Back pain/neck pain, arthritis, joint pain/stiffness  Integumentary: no jaundice  Neurological: + Short-term memory loss  Endocrine:  "no heat or cold intolerance  Heme/Lymph: no easy bruising or bleeding    Objective:  /74   Ht 1.651 m (5' 5\")   Wt 48.5 kg (107 lb)   BMI 17.81 kg/m²     Physical Exam:  Constitutional: No acute distress, conversant, pleasant  Neurologic: alert and oriented  Psych: appropriate affect  Ears, Nose, Mouth and Throat: mucus membranes moist  Pulmonary: No labored breathing  Cardiovascular: Regular rate and rhythm  Abdomen: Nondistended, BMI 17.5  Rectal: She has an external hemorrhoidal skin tag in the right posterior location as well as prolapse of an internal hemorrhoid in this location.  No other perianal abnormalities.  Normal tone on digital rectal exam.  Musculoskeletal: Moves all extremities, no edema  Skin: no jaundice    Procedure: In the prone jackknife position with the assistance of Alexa, my medical assistant, lighted anoscopy was performed. This revealed enlarged internal hemorrhoids.  The largest column was the right posterior column.  However, anteriorly her hemorrhoidal tissue appeared more friable even though it was not the largest column. Hemorrhoidal rubber band ligation was performed in the anterior and right posterior positions without significant discomfort and minimal bleeding. Double bands were used and placed above the dentate line. The patient tolerated the procedure well.    Labs/Imaging:   No new labs or imaging available for review.    Assessment and Plan: Nanette Elliott is a 68 y.o. old female with grade 2 bleeding and prolapsing internal hemorrhoids. I banded hemorrhoids anteriorly and in the right posterior location today.  We again discussed that the long-term treatment of hemorrhoids is keeping bowel movements soft and regular and avoiding straining.  I still recommend daily fiber supplementation, but she may need to reduce her dose if it is making bowel movements too loose, or take it only once daily or every other day.the fiber supplementation should also help prevent " long-term worsening of her diverticular disease.  She also has an upcoming trip planned where she will be driving to [place] to visit her great-grandchildren.  I reminded her to take frequent stops as prolonged sitting can also exacerbate hemorrhoids.  If bleeding were to persist, she will notify my office so we can schedule her back for more banding.  She will need a 5-year surveillance colonoscopy.    Hyacinth Resendiz MD  5/30/2024

## 2024-05-31 DIAGNOSIS — E78.2 MIXED HYPERLIPIDEMIA: ICD-10-CM

## 2024-05-31 RX ORDER — EZETIMIBE 10 MG/1
10 TABLET ORAL DAILY
Qty: 90 TABLET | Refills: 3 | Status: SHIPPED | OUTPATIENT
Start: 2024-05-31

## 2024-06-24 DIAGNOSIS — J01.90 ACUTE NON-RECURRENT SINUSITIS, UNSPECIFIED LOCATION: ICD-10-CM

## 2024-06-24 RX ORDER — AMOXICILLIN 875 MG/1
875 TABLET, FILM COATED ORAL 2 TIMES DAILY
Qty: 20 TABLET | Refills: 0 | Status: SHIPPED | OUTPATIENT
Start: 2024-06-24 | End: 2024-07-04

## 2024-06-24 RX ORDER — PREDNISONE 20 MG/1
TABLET ORAL
Qty: 18 TABLET | Refills: 0 | Status: SHIPPED | OUTPATIENT
Start: 2024-06-24

## 2024-08-26 ENCOUNTER — HOSPITAL ENCOUNTER (OUTPATIENT)
Dept: RADIOLOGY | Facility: HOSPITAL | Age: 69
Discharge: HOME | End: 2024-08-26
Payer: MEDICARE

## 2024-08-26 ENCOUNTER — TELEPHONE (OUTPATIENT)
Dept: PRIMARY CARE | Facility: CLINIC | Age: 69
End: 2024-08-26
Payer: MEDICARE

## 2024-08-26 DIAGNOSIS — S89.91XA INJURY OF RIGHT KNEE, INITIAL ENCOUNTER: ICD-10-CM

## 2024-08-26 PROCEDURE — 73562 X-RAY EXAM OF KNEE 3: CPT | Mod: RT

## 2024-08-26 PROCEDURE — 73562 X-RAY EXAM OF KNEE 3: CPT | Mod: RIGHT SIDE | Performed by: STUDENT IN AN ORGANIZED HEALTH CARE EDUCATION/TRAINING PROGRAM

## 2024-09-03 ENCOUNTER — LAB (OUTPATIENT)
Dept: LAB | Facility: LAB | Age: 69
End: 2024-09-03
Payer: MEDICARE

## 2024-09-03 DIAGNOSIS — E11.9 TYPE 2 DIABETES MELLITUS WITHOUT COMPLICATION, WITHOUT LONG-TERM CURRENT USE OF INSULIN (MULTI): ICD-10-CM

## 2024-09-03 DIAGNOSIS — E46 PROTEIN-CALORIE MALNUTRITION, UNSPECIFIED SEVERITY (MULTI): ICD-10-CM

## 2024-09-03 LAB
ALBUMIN SERPL BCP-MCNC: 4.5 G/DL (ref 3.4–5)
ALP SERPL-CCNC: 15 U/L (ref 33–136)
ALT SERPL W P-5'-P-CCNC: 14 U/L (ref 7–45)
ANION GAP SERPL CALC-SCNC: 11 MMOL/L (ref 10–20)
AST SERPL W P-5'-P-CCNC: 14 U/L (ref 9–39)
BILIRUB SERPL-MCNC: 0.4 MG/DL (ref 0–1.2)
BUN SERPL-MCNC: 15 MG/DL (ref 6–23)
CALCIUM SERPL-MCNC: 9.1 MG/DL (ref 8.6–10.3)
CHLORIDE SERPL-SCNC: 106 MMOL/L (ref 98–107)
CHOLEST SERPL-MCNC: 222 MG/DL (ref 0–199)
CHOLESTEROL/HDL RATIO: 3.4
CO2 SERPL-SCNC: 27 MMOL/L (ref 21–32)
CREAT SERPL-MCNC: 0.68 MG/DL (ref 0.5–1.05)
EGFRCR SERPLBLD CKD-EPI 2021: >90 ML/MIN/1.73M*2
EST. AVERAGE GLUCOSE BLD GHB EST-MCNC: 137 MG/DL
GLUCOSE SERPL-MCNC: 137 MG/DL (ref 74–99)
HBA1C MFR BLD: 6.4 %
HDLC SERPL-MCNC: 66 MG/DL
LDLC SERPL CALC-MCNC: 128 MG/DL
NON HDL CHOLESTEROL: 156 MG/DL (ref 0–149)
POTASSIUM SERPL-SCNC: 4.3 MMOL/L (ref 3.5–5.3)
PROT SERPL-MCNC: 6.6 G/DL (ref 6.4–8.2)
SODIUM SERPL-SCNC: 140 MMOL/L (ref 136–145)
TRIGL SERPL-MCNC: 142 MG/DL (ref 0–149)
TSH SERPL-ACNC: 1.18 MIU/L (ref 0.44–3.98)
VLDL: 28 MG/DL (ref 0–40)

## 2024-09-03 PROCEDURE — 36415 COLL VENOUS BLD VENIPUNCTURE: CPT

## 2024-09-03 PROCEDURE — 80053 COMPREHEN METABOLIC PANEL: CPT

## 2024-09-03 PROCEDURE — 80061 LIPID PANEL: CPT

## 2024-09-03 PROCEDURE — 83036 HEMOGLOBIN GLYCOSYLATED A1C: CPT

## 2024-09-03 PROCEDURE — 84443 ASSAY THYROID STIM HORMONE: CPT

## 2024-09-10 ENCOUNTER — TELEPHONE (OUTPATIENT)
Dept: PRIMARY CARE | Facility: CLINIC | Age: 69
End: 2024-09-10
Payer: MEDICARE

## 2024-09-10 DIAGNOSIS — M25.561 ACUTE PAIN OF RIGHT KNEE: ICD-10-CM

## 2024-09-17 ENCOUNTER — APPOINTMENT (OUTPATIENT)
Dept: ORTHOPEDIC SURGERY | Facility: CLINIC | Age: 69
End: 2024-09-17
Payer: MEDICARE

## 2024-09-17 DIAGNOSIS — M25.561 ACUTE PAIN OF RIGHT KNEE: ICD-10-CM

## 2024-09-17 DIAGNOSIS — S83.91XA SPRAIN OF RIGHT KNEE/LEG, INITIAL ENCOUNTER: Primary | ICD-10-CM

## 2024-09-17 DIAGNOSIS — M17.11 PRIMARY OSTEOARTHRITIS OF RIGHT KNEE: ICD-10-CM

## 2024-09-17 PROCEDURE — 3061F NEG MICROALBUMINURIA REV: CPT | Performed by: NURSE PRACTITIONER

## 2024-09-17 PROCEDURE — 99204 OFFICE O/P NEW MOD 45 MIN: CPT | Performed by: NURSE PRACTITIONER

## 2024-09-17 PROCEDURE — 3049F LDL-C 100-129 MG/DL: CPT | Performed by: NURSE PRACTITIONER

## 2024-09-17 PROCEDURE — 1159F MED LIST DOCD IN RCRD: CPT | Performed by: NURSE PRACTITIONER

## 2024-09-17 PROCEDURE — 1125F AMNT PAIN NOTED PAIN PRSNT: CPT | Performed by: NURSE PRACTITIONER

## 2024-09-17 PROCEDURE — 4010F ACE/ARB THERAPY RXD/TAKEN: CPT | Performed by: NURSE PRACTITIONER

## 2024-09-17 PROCEDURE — 1160F RVW MEDS BY RX/DR IN RCRD: CPT | Performed by: NURSE PRACTITIONER

## 2024-09-17 PROCEDURE — 1157F ADVNC CARE PLAN IN RCRD: CPT | Performed by: NURSE PRACTITIONER

## 2024-09-17 PROCEDURE — L1812 KO ELASTIC W/JOINTS PRE OTS: HCPCS | Performed by: NURSE PRACTITIONER

## 2024-09-17 PROCEDURE — 3044F HG A1C LEVEL LT 7.0%: CPT | Performed by: NURSE PRACTITIONER

## 2024-09-17 RX ORDER — DICLOFENAC SODIUM 10 MG/G
4 GEL TOPICAL 4 TIMES DAILY PRN
Qty: 100 G | Refills: 1 | Status: SHIPPED | OUTPATIENT
Start: 2024-09-17

## 2024-09-17 RX ORDER — DICLOFENAC SODIUM 10 MG/G
4 GEL TOPICAL 4 TIMES DAILY PRN
Qty: 100 G | Refills: 1 | Status: SHIPPED | OUTPATIENT
Start: 2024-09-17 | End: 2024-09-17

## 2024-09-17 ASSESSMENT — PAIN - FUNCTIONAL ASSESSMENT: PAIN_FUNCTIONAL_ASSESSMENT: 0-10

## 2024-09-17 ASSESSMENT — ENCOUNTER SYMPTOMS
KNEE SWELLING: 1
NEUROLOGICAL NEGATIVE: 1
HEMATOLOGIC/LYMPHATIC NEGATIVE: 1
RESPIRATORY NEGATIVE: 1
CARDIOVASCULAR NEGATIVE: 1
ARTHRALGIAS: 1
CONSTITUTIONAL NEGATIVE: 1
PSYCHIATRIC NEGATIVE: 1
ENDOCRINE NEGATIVE: 1

## 2024-09-17 ASSESSMENT — PAIN SCALES - GENERAL: PAINLEVEL_OUTOF10: 8

## 2024-09-17 ASSESSMENT — PAIN DESCRIPTION - DESCRIPTORS: DESCRIPTORS: ACHING

## 2024-09-17 NOTE — ASSESSMENT & PLAN NOTE
Patient to continue to take Tylenol per package directions, diclofenac gel up to 4 times daily.  Patient has tolerated ibuprofen in the past, uses on sparing basis.  We reviewed use for pain, swelling and stiffness and to take with food  Recommended hinged knee brace with weightbearing activity, off with rest and sleep.  Plan for follow-up here in approximately 3 weeks, sooner for changes or concerns.  Patient in agreement with plan of care  This note was generated using Dragon software.  It may contain errors in wording, punctuation or spelling.

## 2024-09-17 NOTE — PROGRESS NOTES
Subjective    Patient ID: Nanette Elliott is a 68 y.o. female.    Chief Complaint: Pain of the Right Knee       Right Knee     Nanette is a pleasant 68-year-old presenting for new patient evaluation of right knee pain.  Patient states she twisted her knee approximately 2 months ago medially while loading into the car, pain and swelling to the lateral aspect since.  Ice does help some with swelling, Tylenol eases pain of it.  Symptoms are aggravated with steps and twisting motion.  Better with not using that joint.  Denies any prior knee issues or surgeries.  Patient does ambulate with cane due to history of vertigo.  Patient has not attempted any braces or wraps.  This is a first evaluation for this injury.      Review of Systems   Constitutional: Negative.    HENT: Negative.     Respiratory: Negative.     Cardiovascular: Negative.    Endocrine: Negative.    Musculoskeletal:  Positive for arthralgias.   Skin: Negative.    Neurological: Negative.    Hematological: Negative.    Psychiatric/Behavioral: Negative.        Objective   Right Knee Exam     Tenderness   The patient is experiencing tenderness in the lateral joint line.    Range of Motion   Extension:  0   Flexion:  110     Tests   Carmencita:  Medial - negative Lateral - positive  Varus: negative Valgus: negative  Drawer:      Posterior - negative    Other   Erythema: absent  Sensation: normal  Pulse: present  Swelling: mild  Effusion: effusion present    Comments:  Mild lateral joint swelling is present, skin is pink, warm, dry and intact.  There is no ecchymosis.  Mild symptom aggravation with palpation of patellar tendon  Mild crepitus palpated during today's exam.  Full range of motion of distal joints with slight symptom aggravation with ankle inversion and eversion.  Symptoms are aggravated with single-leg stand, attempt to squat.  Positive Apley's  Distal motor and sensory intact, cap refill at 2 seconds.          Image Results:  XR knee right 3  views  Narrative: Interpreted By:  Benita Roberts,   STUDY:  XR KNEE RIGHT 3 VIEWS; ;  8/26/2024 10:00 am      INDICATION:  Signs/Symptoms:Injured RT knee.      ,S89.91XA Unspecified injury of right lower leg, initial encounter      COMPARISON:  None.      ACCESSION NUMBER(S):  [id number]      ORDERING CLINICIAN:  DONY ROSE      FINDINGS:  Three views of right knee were performed.      There is no acute fracture or dislocation. There are minimal  degenerative changes with mild joint space narrowing in the lateral  femorotibial compartment. No significant suprapatellar joint  effusion. No obvious soft tissue abnormality.      Impression: Minimal degenerative changes in the lateral femorotibial compartment.  Otherwise no acute fracture or dislocation.          MACRO:  None      Signed by: Benita Roberts 8/29/2024 4:08 PM  Dictation workstation:   [id number]    Independent review of knee x-rays completed during today's exam.  There is no acute fracture or misalignment noted.  There is mild joint space narrowing in the lateral compartment, mild infrapatellar effusion present    Assessment/Plan   Encounter Diagnoses:  Problem List Items Addressed This Visit             ICD-10-CM    Primary osteoarthritis of right knee - Primary M17.11    Relevant Medications    diclofenac sodium (Voltaren) 1 % gel    Sprain of right knee/leg S83.91XA     Patient to continue to take Tylenol per package directions, diclofenac gel up to 4 times daily.  Patient has tolerated ibuprofen in the past, uses on sparing basis.  We reviewed use for pain, swelling and stiffness and to take with food  Recommended hinged knee brace with weightbearing activity, off with rest and sleep.  Plan for follow-up here in approximately 3 weeks, sooner for changes or concerns.  Patient in agreement with plan of care  This note was generated using Dragon software.  It may contain errors in wording, punctuation or spelling.          Other Visit  Diagnoses         Codes    Acute pain of right knee     M25.561

## 2024-09-19 ENCOUNTER — APPOINTMENT (OUTPATIENT)
Dept: PRIMARY CARE | Facility: CLINIC | Age: 69
End: 2024-09-19
Payer: MEDICARE

## 2024-09-19 VITALS
HEART RATE: 110 BPM | WEIGHT: 104.8 LBS | HEIGHT: 65 IN | DIASTOLIC BLOOD PRESSURE: 73 MMHG | SYSTOLIC BLOOD PRESSURE: 129 MMHG | BODY MASS INDEX: 17.46 KG/M2

## 2024-09-19 DIAGNOSIS — E78.2 MIXED HYPERLIPIDEMIA: ICD-10-CM

## 2024-09-19 DIAGNOSIS — I65.29 STENOSIS OF CAROTID ARTERY, UNSPECIFIED LATERALITY: ICD-10-CM

## 2024-09-19 DIAGNOSIS — E11.43 DIABETIC AUTONOMIC NEUROPATHY ASSOCIATED WITH TYPE 2 DIABETES MELLITUS (MULTI): ICD-10-CM

## 2024-09-19 DIAGNOSIS — L21.9 SEBORRHEIC DERMATITIS: Primary | ICD-10-CM

## 2024-09-19 DIAGNOSIS — M17.11 PRIMARY OSTEOARTHRITIS OF RIGHT KNEE: ICD-10-CM

## 2024-09-19 DIAGNOSIS — I10 PRIMARY HYPERTENSION: ICD-10-CM

## 2024-09-19 DIAGNOSIS — Z78.9 STATIN INTOLERANCE: ICD-10-CM

## 2024-09-19 DIAGNOSIS — G89.29 CHRONIC NECK PAIN: ICD-10-CM

## 2024-09-19 DIAGNOSIS — J43.2 CENTRILOBULAR EMPHYSEMA (MULTI): ICD-10-CM

## 2024-09-19 DIAGNOSIS — M54.2 CHRONIC NECK PAIN: ICD-10-CM

## 2024-09-19 PROCEDURE — 3008F BODY MASS INDEX DOCD: CPT | Performed by: INTERNAL MEDICINE

## 2024-09-19 PROCEDURE — 1160F RVW MEDS BY RX/DR IN RCRD: CPT | Performed by: INTERNAL MEDICINE

## 2024-09-19 PROCEDURE — 4010F ACE/ARB THERAPY RXD/TAKEN: CPT | Performed by: INTERNAL MEDICINE

## 2024-09-19 PROCEDURE — 3044F HG A1C LEVEL LT 7.0%: CPT | Performed by: INTERNAL MEDICINE

## 2024-09-19 PROCEDURE — 1159F MED LIST DOCD IN RCRD: CPT | Performed by: INTERNAL MEDICINE

## 2024-09-19 PROCEDURE — 3049F LDL-C 100-129 MG/DL: CPT | Performed by: INTERNAL MEDICINE

## 2024-09-19 PROCEDURE — 3061F NEG MICROALBUMINURIA REV: CPT | Performed by: INTERNAL MEDICINE

## 2024-09-19 PROCEDURE — 1157F ADVNC CARE PLAN IN RCRD: CPT | Performed by: INTERNAL MEDICINE

## 2024-09-19 PROCEDURE — 3074F SYST BP LT 130 MM HG: CPT | Performed by: INTERNAL MEDICINE

## 2024-09-19 PROCEDURE — 99214 OFFICE O/P EST MOD 30 MIN: CPT | Performed by: INTERNAL MEDICINE

## 2024-09-19 PROCEDURE — 3078F DIAST BP <80 MM HG: CPT | Performed by: INTERNAL MEDICINE

## 2024-09-19 RX ORDER — KETOCONAZOLE 20 MG/ML
SHAMPOO, SUSPENSION TOPICAL 2 TIMES WEEKLY
Qty: 120 ML | Refills: 0 | Status: SHIPPED | OUTPATIENT
Start: 2024-09-19

## 2024-09-19 ASSESSMENT — ENCOUNTER SYMPTOMS
FATIGUE: 0
ABDOMINAL DISTENTION: 0
NAUSEA: 0
VOMITING: 0
COUGH: 0
WHEEZING: 0
APPETITE CHANGE: 0
SORE THROAT: 0
CHILLS: 0
DIARRHEA: 0
ACTIVITY CHANGE: 0
BACK PAIN: 0
SHORTNESS OF BREATH: 0
NUMBNESS: 0
SINUS PRESSURE: 0
WEAKNESS: 0
SINUS PAIN: 0

## 2024-09-19 ASSESSMENT — PATIENT HEALTH QUESTIONNAIRE - PHQ9
1. LITTLE INTEREST OR PLEASURE IN DOING THINGS: NOT AT ALL
2. FEELING DOWN, DEPRESSED OR HOPELESS: NOT AT ALL
SUM OF ALL RESPONSES TO PHQ9 QUESTIONS 1 AND 2: 0

## 2024-09-19 NOTE — PROGRESS NOTES
"Subjective   Patient ID: Nanette Elliott is a 68 y.o. female who presents for Follow-up (6 month).  HPI  Patient is here today for 6 mo follow up     Pt is wearing right knee brace, seeing ortho.     Has noticed thinning of the hair.     Has alost a few more lbs since her last appt .    She has gotten hearing aids.     Review of Systems   Constitutional:  Negative for activity change, appetite change, chills and fatigue.   HENT:  Negative for congestion, postnasal drip, sinus pressure, sinus pain and sore throat.    Respiratory:  Negative for cough, shortness of breath and wheezing.    Cardiovascular:  Negative for chest pain and leg swelling.   Gastrointestinal:  Negative for abdominal distention, diarrhea, nausea and vomiting.   Musculoskeletal:  Negative for back pain.   Neurological:  Negative for weakness and numbness.       Objective   /73   Pulse 110   Ht 1.651 m (5' 5\")   Wt 47.5 kg (104 lb 12.8 oz)   BMI 17.44 kg/m²     Physical Exam  Constitutional:       General: She is not in acute distress.     Appearance: Normal appearance.   HENT:      Head: Normocephalic.      Nose: Nose normal.      Mouth/Throat:      Pharynx: No oropharyngeal exudate.   Eyes:      General:         Right eye: No discharge.         Left eye: No discharge.      Extraocular Movements: Extraocular movements intact.      Pupils: Pupils are equal, round, and reactive to light.   Cardiovascular:      Rate and Rhythm: Normal rate and regular rhythm.      Heart sounds: No murmur heard.     No gallop.   Pulmonary:      Effort: Pulmonary effort is normal. No respiratory distress.      Breath sounds: Normal breath sounds. No wheezing.   Musculoskeletal:         General: No swelling. Normal range of motion.   Skin:     General: Skin is warm and dry.      Coloration: Skin is not jaundiced.   Neurological:      General: No focal deficit present.      Mental Status: She is alert and oriented to person, place, and time.      Cranial " Nerves: No cranial nerve deficit.   Psychiatric:         Mood and Affect: Mood normal.         Behavior: Behavior normal.           Assessment/Plan   Problem List Items Addressed This Visit       Carotid artery stenosis    COPD (chronic obstructive pulmonary disease) (Multi)    Mixed hyperlipidemia    HTN (hypertension)    Statin intolerance    Diabetic neuropathy associated with type 2 diabetes mellitus (Multi)    Chronic neck pain    Primary osteoarthritis of right knee     Other Visit Diagnoses       Seborrheic dermatitis    -  Primary    Relevant Medications    ketoconazole (NIZOral) 2 % shampoo          Immunizations   Flu shot will get a t appt complex   COVID received   PNA received   Shingles Received   RSV recommended     Mammo 2023 , wants to do every 2   DEXA 2023   Colon cancer screening 2024   Pap NA   1. Recurrent dysuria, saw Dr Mata  - on Urogesiv  - has not had any more urinary tract infections      2. COPD  - continue albuterol prn    - continue tudorza      3. Chronic back pain  - continue gabapentin  - continue tizanidine   - stopped seeing pain management because injections were not helping     will update xrays, consider doing mri      4. HTN   - continue valsartan, will have her cut in half for a week and see if dizziness improves     5. DMII ., controlled   - continue metformin   - A1c 6.7% -> 6.4%       6. HLD   - chol higher off statin, did not tolerate muscle pain, continue zetia      - ldl 140          7. Thining of the hair, can try ketoconazole shampoo but do not see evidence of seb derm at the moment      8. BMI 17   - weight stable 104     Final diagnoses:   [L21.9] Seborrheic dermatitis   [Z78.9] Statin intolerance   [I65.29] Stenosis of carotid artery, unspecified laterality   [I10] Primary hypertension   [E78.2] Mixed hyperlipidemia   [M54.2, G89.29] Chronic neck pain   [M17.11] Primary osteoarthritis of right knee   [E11.43] Diabetic autonomic neuropathy associated with type 2  diabetes mellitus (Multi)   [J43.2] Centrilobular emphysema (Multi)

## 2024-09-20 ENCOUNTER — APPOINTMENT (OUTPATIENT)
Dept: PRIMARY CARE | Facility: CLINIC | Age: 69
End: 2024-09-20
Payer: MEDICARE

## 2024-09-23 DIAGNOSIS — L21.9 SEBORRHEIC DERMATITIS: ICD-10-CM

## 2024-09-23 RX ORDER — KETOCONAZOLE 20 MG/ML
SHAMPOO, SUSPENSION TOPICAL 2 TIMES WEEKLY
Qty: 120 ML | Refills: 0 | Status: SHIPPED | OUTPATIENT
Start: 2024-09-23

## 2024-10-03 ENCOUNTER — TELEPHONE (OUTPATIENT)
Dept: PRIMARY CARE | Facility: CLINIC | Age: 69
End: 2024-10-03
Payer: MEDICARE

## 2024-10-03 DIAGNOSIS — F17.210 CIGARETTE SMOKER: ICD-10-CM

## 2024-10-03 DIAGNOSIS — Z12.2 ENCOUNTER FOR SCREENING FOR LUNG CANCER: Primary | ICD-10-CM

## 2024-10-08 ENCOUNTER — APPOINTMENT (OUTPATIENT)
Dept: ORTHOPEDIC SURGERY | Facility: CLINIC | Age: 69
End: 2024-10-08
Payer: MEDICARE

## 2024-10-14 ENCOUNTER — APPOINTMENT (OUTPATIENT)
Dept: RADIOLOGY | Facility: HOSPITAL | Age: 69
End: 2024-10-14
Payer: MEDICARE

## 2024-10-21 ENCOUNTER — HOSPITAL ENCOUNTER (OUTPATIENT)
Dept: RADIOLOGY | Facility: HOSPITAL | Age: 69
Discharge: HOME | End: 2024-10-21
Payer: MEDICARE

## 2024-10-21 DIAGNOSIS — Z12.2 ENCOUNTER FOR SCREENING FOR LUNG CANCER: ICD-10-CM

## 2024-10-21 DIAGNOSIS — F17.210 CIGARETTE SMOKER: ICD-10-CM

## 2024-10-21 PROCEDURE — 71271 CT THORAX LUNG CANCER SCR C-: CPT

## 2024-10-21 PROCEDURE — 71271 CT THORAX LUNG CANCER SCR C-: CPT | Performed by: RADIOLOGY

## 2024-10-24 ENCOUNTER — HOSPITAL ENCOUNTER (OUTPATIENT)
Dept: RADIOLOGY | Facility: EXTERNAL LOCATION | Age: 69
Discharge: HOME | End: 2024-10-24

## 2024-10-24 ENCOUNTER — APPOINTMENT (OUTPATIENT)
Dept: ORTHOPEDIC SURGERY | Facility: CLINIC | Age: 69
End: 2024-10-24
Payer: MEDICARE

## 2024-10-24 DIAGNOSIS — M17.11 PRIMARY OSTEOARTHRITIS OF RIGHT KNEE: ICD-10-CM

## 2024-10-24 PROCEDURE — 3044F HG A1C LEVEL LT 7.0%: CPT | Performed by: SPECIALIST

## 2024-10-24 PROCEDURE — 3061F NEG MICROALBUMINURIA REV: CPT | Performed by: SPECIALIST

## 2024-10-24 PROCEDURE — 1157F ADVNC CARE PLAN IN RCRD: CPT | Performed by: SPECIALIST

## 2024-10-24 PROCEDURE — 4010F ACE/ARB THERAPY RXD/TAKEN: CPT | Performed by: SPECIALIST

## 2024-10-24 PROCEDURE — 99214 OFFICE O/P EST MOD 30 MIN: CPT | Performed by: SPECIALIST

## 2024-10-24 PROCEDURE — 1159F MED LIST DOCD IN RCRD: CPT | Performed by: SPECIALIST

## 2024-10-24 PROCEDURE — 1160F RVW MEDS BY RX/DR IN RCRD: CPT | Performed by: SPECIALIST

## 2024-10-24 PROCEDURE — 3049F LDL-C 100-129 MG/DL: CPT | Performed by: SPECIALIST

## 2024-10-24 ASSESSMENT — PAIN - FUNCTIONAL ASSESSMENT: PAIN_FUNCTIONAL_ASSESSMENT: NO/DENIES PAIN

## 2024-10-24 NOTE — ASSESSMENT & PLAN NOTE
Assessment: Right knee arthritis much improved with Voltaren gel and bracing.  She states she is 80% better.  She is diabetic.    Plan:  Continue with her home exercise program.  Use the cane on a as needed basis.  Follow-up in 6 to 8 weeks for recheck.

## 2024-10-24 NOTE — PROGRESS NOTES
Assessment/Plan   Encounter Diagnoses:  Primary osteoarthritis of right knee  Primary osteoarthritis of right knee  Assessment: Right knee arthritis much improved with Voltaren gel and bracing.  She states she is 80% better.  She is diabetic.    Plan:  Continue with her home exercise program.  Use the cane on a as needed basis.  Follow-up in 6 to 8 weeks for recheck.       Subjective    Patient ID: Nanette Elliott is a 68 y.o. female.    Chief Complaint: Follow-up of the Right Knee (X-RAYS 8-26-24/HAS KNEE BRACE)     Last Surgery: No surgery found  Last Surgery Date: No surgery found    HPI  68-year-old with primary osteoarthritis of the knee.  Bracing, Voltaren gel, use of a cane and home exercises have helped her improve her situation.  She states she is 80% improved.    OBJECTIVE: ORTHO EXAM  Right knee exam  Scant to no effusion.  0 to 130 degrees range of motion.  No calor or erythema.  Nontender to palpation including both joint lines.  Her ligamentous exam is unchanged.      IMAGE RESULTS:  Point of Care Ultrasound  These images are not reportable by radiology and will not be interpreted   by  Radiologists.      ULTRASOUND  DIAGNOSTIC ULTRASOUND REPORT FINAL: Right KNEE  Sonographer: Justin Mcdowell MD  Indication: Knee Pain  Procedure: Ultrasound, extremity, nonvascular, real-time, COMPLETE, anatomic specific  Technique: B-Mode Ultrasound Examination performed using 6- 9 MHz linear transducer with [employer] Software  STUDY TYPE:   1. ULTRASOUND EXTREMITY  2. REAL TIME WITH IMAGE DOCUMENTATION  3. NON-VASCULAR  4. COMPLETE STUDY, INCLUDING BUT NOT LIMITED TO MUSCLE, TENDONS, LIGAMENTS, SOFT TISSUES, ADIPOSE TISSUE AND SUBCUTANEOUS TISSUE.  Site: KNEE   Live ultrasound was performed with of patient's  KNEE and PERMANENTLY documented. I personally performed the ultrasound and reviewed the findings. These show:    Miriam-articular evaluation:   An intact Quadriceps Tendon with the Quadriceps Muscle fibers showing normal  striations Quadriceps Tendon demonstrating normal fibrillar pattern. . The Patellar Tendon demonstrates normal fibrillar pattern and is intact.   No significant soft tissue fluid collection/abscess appreciated.     Joint Evaluation:  The lateral joint line shows an intact LCL.   Medial joint line exam shows an intact MCL.   The patellar tendon was within normal limits.  Scant joint effusion noted.  Only visible with quad activation  The patient tolerated the procedure well.        Procedures     Orders Placed This Encounter    Point of Care Ultrasound

## 2024-11-14 ENCOUNTER — APPOINTMENT (OUTPATIENT)
Dept: ORTHOPEDIC SURGERY | Facility: CLINIC | Age: 69
End: 2024-11-14
Payer: MEDICARE

## 2024-11-17 DIAGNOSIS — M81.6 LOCALIZED OSTEOPOROSIS WITHOUT CURRENT PATHOLOGICAL FRACTURE: ICD-10-CM

## 2024-11-18 RX ORDER — ALENDRONATE SODIUM 70 MG/1
70 TABLET ORAL
Qty: 12 TABLET | Refills: 3 | Status: SHIPPED | OUTPATIENT
Start: 2024-11-18 | End: 2025-11-18

## 2024-11-27 DIAGNOSIS — J44.9 CHRONIC OBSTRUCTIVE PULMONARY DISEASE, UNSPECIFIED: ICD-10-CM

## 2024-11-27 RX ORDER — ALBUTEROL SULFATE 90 UG/1
4 INHALANT RESPIRATORY (INHALATION) EVERY 6 HOURS PRN
Qty: 18 G | Refills: 11 | Status: SHIPPED | OUTPATIENT
Start: 2024-11-27

## 2024-11-29 DIAGNOSIS — M17.11 PRIMARY OSTEOARTHRITIS OF RIGHT KNEE: ICD-10-CM

## 2024-11-29 RX ORDER — DICLOFENAC SODIUM 10 MG/G
GEL TOPICAL
Qty: 100 G | Refills: 1 | Status: SHIPPED | OUTPATIENT
Start: 2024-11-29

## 2024-12-19 ENCOUNTER — HOSPITAL ENCOUNTER (OUTPATIENT)
Dept: RADIOLOGY | Facility: EXTERNAL LOCATION | Age: 69
Discharge: HOME | End: 2024-12-19

## 2024-12-19 ENCOUNTER — APPOINTMENT (OUTPATIENT)
Dept: ORTHOPEDIC SURGERY | Facility: CLINIC | Age: 69
End: 2024-12-19
Payer: MEDICARE

## 2024-12-19 ENCOUNTER — HOSPITAL ENCOUNTER (OUTPATIENT)
Dept: RADIOLOGY | Facility: CLINIC | Age: 69
Discharge: HOME | End: 2024-12-19
Payer: MEDICARE

## 2024-12-19 DIAGNOSIS — M17.11 PRIMARY OSTEOARTHRITIS OF RIGHT KNEE: ICD-10-CM

## 2024-12-19 DIAGNOSIS — S72.051Q: ICD-10-CM

## 2024-12-19 PROCEDURE — 3061F NEG MICROALBUMINURIA REV: CPT | Performed by: SPECIALIST

## 2024-12-19 PROCEDURE — 3044F HG A1C LEVEL LT 7.0%: CPT | Performed by: SPECIALIST

## 2024-12-19 PROCEDURE — 1159F MED LIST DOCD IN RCRD: CPT | Performed by: SPECIALIST

## 2024-12-19 PROCEDURE — 1160F RVW MEDS BY RX/DR IN RCRD: CPT | Performed by: SPECIALIST

## 2024-12-19 PROCEDURE — 3049F LDL-C 100-129 MG/DL: CPT | Performed by: SPECIALIST

## 2024-12-19 PROCEDURE — 4010F ACE/ARB THERAPY RXD/TAKEN: CPT | Performed by: SPECIALIST

## 2024-12-19 PROCEDURE — 1157F ADVNC CARE PLAN IN RCRD: CPT | Performed by: SPECIALIST

## 2024-12-19 PROCEDURE — 1125F AMNT PAIN NOTED PAIN PRSNT: CPT | Performed by: SPECIALIST

## 2024-12-19 PROCEDURE — 76882 US LMTD JT/FCL EVL NVASC XTR: CPT | Performed by: SPECIALIST

## 2024-12-19 PROCEDURE — 99214 OFFICE O/P EST MOD 30 MIN: CPT | Performed by: SPECIALIST

## 2024-12-19 PROCEDURE — 73564 X-RAY EXAM KNEE 4 OR MORE: CPT | Mod: RIGHT SIDE | Performed by: RADIOLOGY

## 2024-12-19 PROCEDURE — 73564 X-RAY EXAM KNEE 4 OR MORE: CPT | Mod: RT

## 2024-12-19 ASSESSMENT — PAIN - FUNCTIONAL ASSESSMENT: PAIN_FUNCTIONAL_ASSESSMENT: 0-10

## 2024-12-19 ASSESSMENT — PAIN SCALES - GENERAL: PAINLEVEL_OUTOF10: 7

## 2024-12-19 NOTE — PROGRESS NOTES
Assessment/Plan   Encounter Diagnoses:  Primary osteoarthritis of right knee    Unspecified fracture of head of right femur, subsequent encounter for open fracture type I or II with malunion  Primary osteoarthritis of right knee  Assessment: Right knee arthritis much improved with Voltaren gel and bracing.  She states she is 80% better.  She is diabetic.    Plan:  Continue with her home exercise program.  Use the cane on a as needed basis.  Follow-up in 6 months for recheck.  We discussed the risks and benefits of various treatment options including a booster cortisone injection or viscosupplementation injections.  She wants to defer this for now but she would consider it in the future.       Subjective    Patient ID: Nanette Elliott is a 69 y.o. female.    Chief Complaint: Follow-up of the Right Knee (X-RAYS 12-19-24/HAS KNEE BRACE)     Last Surgery: No surgery found  Last Surgery Date: No surgery found    HPI  68-year-old with primary osteoarthritis of the knee.  Bracing, Voltaren gel, use of a cane and home exercises have helped her improve her situation.  She states she is 80% improved.    12/19/2024-she states that she is at her baseline.  She remains about 80% improved from her initial presentation.  She uses a knee brace and the Voltaren gel is quite helpful.    OBJECTIVE: ORTHO EXAM  Right knee exam  Scant to no effusion.  0 to 130 degrees range of motion.  No calor or erythema.  Nontender to palpation including both joint lines.  Her ligamentous exam is unchanged.      IMAGE RESULTS:  Point of Care Ultrasound  These images are not reportable by radiology and will not be interpreted   by  Radiologists.      ULTRASOUND  DIAGNOSTIC ULTRASOUND REPORT FINAL: Right KNEE  Sonographer: Justin Mcdowell MD  Indication: Knee Pain  Procedure: Ultrasound, extremity, nonvascular, real-time, COMPLETE, anatomic specific  Technique: B-Mode Ultrasound Examination performed using 6- 9 MHz linear transducer with MSK  Software  STUDY TYPE:   1. ULTRASOUND EXTREMITY  2. REAL TIME WITH IMAGE DOCUMENTATION  3. NON-VASCULAR  4. COMPLETE STUDY, INCLUDING BUT NOT LIMITED TO MUSCLE, TENDONS, LIGAMENTS, SOFT TISSUES, ADIPOSE TISSUE AND SUBCUTANEOUS TISSUE.  Site: KNEE   Live ultrasound was performed with of patient's  KNEE and PERMANENTLY documented. I personally performed the ultrasound and reviewed the findings. These show:    Miriam-articular evaluation:   An intact Quadriceps Tendon with the Quadriceps Muscle fibers showing normal striations Quadriceps Tendon demonstrating normal fibrillar pattern. . The Patellar Tendon demonstrates normal fibrillar pattern and is intact.   No significant soft tissue fluid collection/abscess appreciated.     Joint Evaluation:  The lateral joint line shows an intact LCL.   Medial joint line exam shows an intact MCL.   The patellar tendon was within normal limits.  Scant joint effusion noted.  Only visible with quad activation  The patient tolerated the procedure well.        Procedures     Orders Placed This Encounter    Point of Care Ultrasound    XR knee right 4+ views

## 2024-12-19 NOTE — ASSESSMENT & PLAN NOTE
Assessment: Right knee arthritis much improved with Voltaren gel and bracing.  She states she is 80% better.  She is diabetic.    Plan:  Continue with her home exercise program.  Use the cane on a as needed basis.  Follow-up in 6 months for recheck.  We discussed the risks and benefits of various treatment options including a booster cortisone injection or viscosupplementation injections.  She wants to defer this for now but she would consider it in the future.

## 2024-12-26 DIAGNOSIS — J30.2 SEASONAL ALLERGIES: Primary | ICD-10-CM

## 2024-12-26 DIAGNOSIS — M50.20 COMPRESSED CERVICAL DISC: ICD-10-CM

## 2024-12-26 DIAGNOSIS — E11.9 TYPE 2 DIABETES MELLITUS WITHOUT COMPLICATION, WITHOUT LONG-TERM CURRENT USE OF INSULIN (MULTI): ICD-10-CM

## 2024-12-26 DIAGNOSIS — Z78.0 POST-MENOPAUSAL: ICD-10-CM

## 2024-12-26 DIAGNOSIS — E78.2 MIXED HYPERLIPIDEMIA: ICD-10-CM

## 2024-12-26 DIAGNOSIS — I10 PRIMARY HYPERTENSION: ICD-10-CM

## 2024-12-30 RX ORDER — METFORMIN HYDROCHLORIDE 500 MG/1
1000 TABLET, EXTENDED RELEASE ORAL
Qty: 360 TABLET | Refills: 3 | Status: SHIPPED | OUTPATIENT
Start: 2024-12-30

## 2024-12-30 RX ORDER — GABAPENTIN 300 MG/1
CAPSULE ORAL
Qty: 720 CAPSULE | Refills: 3 | Status: SHIPPED | OUTPATIENT
Start: 2024-12-30

## 2024-12-30 RX ORDER — FLUTICASONE PROPIONATE 50 MCG
2 SPRAY, SUSPENSION (ML) NASAL DAILY
Qty: 16 G | Refills: 11 | Status: SHIPPED | OUTPATIENT
Start: 2024-12-30

## 2024-12-30 RX ORDER — VALSARTAN 40 MG/1
40 TABLET ORAL DAILY
Qty: 90 TABLET | Refills: 3 | Status: SHIPPED | OUTPATIENT
Start: 2024-12-30

## 2024-12-30 RX ORDER — MEDROXYPROGESTERONE ACETATE 10 MG/1
10 TABLET ORAL DAILY
Qty: 90 TABLET | Refills: 3 | Status: SHIPPED | OUTPATIENT
Start: 2024-12-30

## 2024-12-30 RX ORDER — EZETIMIBE 10 MG/1
10 TABLET ORAL DAILY
Qty: 90 TABLET | Refills: 3 | Status: SHIPPED | OUTPATIENT
Start: 2024-12-30

## 2025-03-07 ENCOUNTER — HOSPITAL ENCOUNTER (OUTPATIENT)
Dept: CARDIOLOGY | Facility: HOSPITAL | Age: 70
Discharge: HOME | End: 2025-03-07
Payer: MEDICARE

## 2025-03-07 ENCOUNTER — HOSPITAL ENCOUNTER (EMERGENCY)
Facility: HOSPITAL | Age: 70
Discharge: HOME | End: 2025-03-07
Payer: MEDICARE

## 2025-03-07 ENCOUNTER — APPOINTMENT (OUTPATIENT)
Dept: RADIOLOGY | Facility: HOSPITAL | Age: 70
End: 2025-03-07
Payer: MEDICARE

## 2025-03-07 VITALS
BODY MASS INDEX: 17.99 KG/M2 | SYSTOLIC BLOOD PRESSURE: 124 MMHG | HEART RATE: 86 BPM | OXYGEN SATURATION: 96 % | WEIGHT: 108 LBS | RESPIRATION RATE: 18 BRPM | HEIGHT: 65 IN | TEMPERATURE: 98 F | DIASTOLIC BLOOD PRESSURE: 76 MMHG

## 2025-03-07 DIAGNOSIS — J44.1 COPD EXACERBATION (MULTI): Primary | ICD-10-CM

## 2025-03-07 LAB
ALBUMIN SERPL BCP-MCNC: 4.4 G/DL (ref 3.4–5)
ALP SERPL-CCNC: 16 U/L (ref 33–136)
ALT SERPL W P-5'-P-CCNC: 10 U/L (ref 7–45)
ANION GAP SERPL CALC-SCNC: 12 MMOL/L (ref 10–20)
AST SERPL W P-5'-P-CCNC: 13 U/L (ref 9–39)
BILIRUB SERPL-MCNC: 0.3 MG/DL (ref 0–1.2)
BNP SERPL-MCNC: 8 PG/ML (ref 0–99)
BUN SERPL-MCNC: 15 MG/DL (ref 6–23)
CALCIUM SERPL-MCNC: 8.9 MG/DL (ref 8.6–10.3)
CARDIAC TROPONIN I PNL SERPL HS: <3 NG/L (ref 0–13)
CARDIAC TROPONIN I PNL SERPL HS: <3 NG/L (ref 0–13)
CHLORIDE SERPL-SCNC: 104 MMOL/L (ref 98–107)
CO2 SERPL-SCNC: 25 MMOL/L (ref 21–32)
CREAT SERPL-MCNC: 0.74 MG/DL (ref 0.5–1.05)
EGFRCR SERPLBLD CKD-EPI 2021: 88 ML/MIN/1.73M*2
ERYTHROCYTE [DISTWIDTH] IN BLOOD BY AUTOMATED COUNT: 12.6 % (ref 11.5–14.5)
FLUAV RNA RESP QL NAA+PROBE: NOT DETECTED
FLUBV RNA RESP QL NAA+PROBE: NOT DETECTED
GLUCOSE SERPL-MCNC: 176 MG/DL (ref 74–99)
HCT VFR BLD AUTO: 39.7 % (ref 36–46)
HGB BLD-MCNC: 13 G/DL (ref 12–16)
HOLD SPECIMEN: NORMAL
MAGNESIUM SERPL-MCNC: 1.91 MG/DL (ref 1.6–2.4)
MCH RBC QN AUTO: 29.7 PG (ref 26–34)
MCHC RBC AUTO-ENTMCNC: 32.7 G/DL (ref 32–36)
MCV RBC AUTO: 91 FL (ref 80–100)
NRBC BLD-RTO: 0 /100 WBCS (ref 0–0)
PLATELET # BLD AUTO: 271 X10*3/UL (ref 150–450)
POTASSIUM SERPL-SCNC: 4 MMOL/L (ref 3.5–5.3)
PROT SERPL-MCNC: 6.7 G/DL (ref 6.4–8.2)
RBC # BLD AUTO: 4.37 X10*6/UL (ref 4–5.2)
SARS-COV-2 RNA RESP QL NAA+PROBE: NOT DETECTED
SODIUM SERPL-SCNC: 137 MMOL/L (ref 136–145)
WBC # BLD AUTO: 8.6 X10*3/UL (ref 4.4–11.3)

## 2025-03-07 PROCEDURE — 36415 COLL VENOUS BLD VENIPUNCTURE: CPT | Performed by: PHYSICIAN ASSISTANT

## 2025-03-07 PROCEDURE — 71045 X-RAY EXAM CHEST 1 VIEW: CPT

## 2025-03-07 PROCEDURE — 80053 COMPREHEN METABOLIC PANEL: CPT | Performed by: PHYSICIAN ASSISTANT

## 2025-03-07 PROCEDURE — 99285 EMERGENCY DEPT VISIT HI MDM: CPT

## 2025-03-07 PROCEDURE — 83735 ASSAY OF MAGNESIUM: CPT | Performed by: PHYSICIAN ASSISTANT

## 2025-03-07 PROCEDURE — 85027 COMPLETE CBC AUTOMATED: CPT | Performed by: PHYSICIAN ASSISTANT

## 2025-03-07 PROCEDURE — 83880 ASSAY OF NATRIURETIC PEPTIDE: CPT | Performed by: PHYSICIAN ASSISTANT

## 2025-03-07 PROCEDURE — 93005 ELECTROCARDIOGRAM TRACING: CPT

## 2025-03-07 PROCEDURE — 84484 ASSAY OF TROPONIN QUANT: CPT | Performed by: PHYSICIAN ASSISTANT

## 2025-03-07 PROCEDURE — 71045 X-RAY EXAM CHEST 1 VIEW: CPT | Mod: FOREIGN READ | Performed by: RADIOLOGY

## 2025-03-07 PROCEDURE — 87502 INFLUENZA DNA AMP PROBE: CPT | Performed by: PHYSICIAN ASSISTANT

## 2025-03-07 RX ORDER — PREDNISONE 20 MG/1
40 TABLET ORAL ONCE
Status: DISCONTINUED | OUTPATIENT
Start: 2025-03-07 | End: 2025-03-07 | Stop reason: HOSPADM

## 2025-03-07 RX ORDER — DOXYCYCLINE 100 MG/1
100 CAPSULE ORAL 2 TIMES DAILY
Qty: 20 CAPSULE | Refills: 0 | Status: SHIPPED | OUTPATIENT
Start: 2025-03-07 | End: 2025-03-17

## 2025-03-07 RX ORDER — ACETAMINOPHEN 500 MG
500 TABLET ORAL EVERY 6 HOURS PRN
COMMUNITY

## 2025-03-07 ASSESSMENT — PAIN SCALES - GENERAL: PAINLEVEL_OUTOF10: 7

## 2025-03-07 ASSESSMENT — PAIN - FUNCTIONAL ASSESSMENT: PAIN_FUNCTIONAL_ASSESSMENT: 0-10

## 2025-03-07 ASSESSMENT — COLUMBIA-SUICIDE SEVERITY RATING SCALE - C-SSRS
2. HAVE YOU ACTUALLY HAD ANY THOUGHTS OF KILLING YOURSELF?: NO
1. IN THE PAST MONTH, HAVE YOU WISHED YOU WERE DEAD OR WISHED YOU COULD GO TO SLEEP AND NOT WAKE UP?: NO
6. HAVE YOU EVER DONE ANYTHING, STARTED TO DO ANYTHING, OR PREPARED TO DO ANYTHING TO END YOUR LIFE?: NO

## 2025-03-07 ASSESSMENT — PAIN DESCRIPTION - LOCATION: LOCATION: CHEST

## 2025-03-08 LAB
ATRIAL RATE: 103 BPM
P AXIS: 80 DEGREES
P OFFSET: 209 MS
P ONSET: 157 MS
PR INTERVAL: 140 MS
Q ONSET: 227 MS
QRS COUNT: 17 BEATS
QRS DURATION: 68 MS
QT INTERVAL: 322 MS
QTC CALCULATION(BAZETT): 421 MS
QTC FREDERICIA: 385 MS
R AXIS: 72 DEGREES
T AXIS: 67 DEGREES
T OFFSET: 388 MS
VENTRICULAR RATE: 103 BPM

## 2025-03-08 NOTE — ED PROVIDER NOTES
HPI   Chief Complaint   Patient presents with    Cough    Dizziness     Pt complains of productive yellow cough x 2 months. Complains of sharp pain in her lungs with coughing. Pt is a smoker with chronic cough. Also complains of dizziness that started today, but states she does have a hx of vertigo. Pt states that she also has a vaginal odor that she has been unable to treat  with OTC.        Presents with cough and congestion for the past 2 days.  States she also has some burning to her chest when she coughs hard.  Denies any fever or chills.  Cough is mostly dry with occasional sputum.  States she was smoking in her car with another person who started coughing and she thought that maybe this was where she may have picked up an illness.  She denies any chest pain or shortness of breath.      History provided by:  Patient          Patient History   Past Medical History:   Diagnosis Date    Asymptomatic menopausal state     History of menopause    COPD (chronic obstructive pulmonary disease) (Multi)     Diabetes (Multi)     Encounter for full-term uncomplicated delivery     Normal vaginal delivery    Encounter for gynecological examination (general) (routine) without abnormal findings 11/05/2021    Pap test, as part of routine gynecological examination    Hearing disorder     Hypertension     Other conditions influencing health status     Menstruation    Personal history of other medical treatment     History of screening mammography     Past Surgical History:   Procedure Laterality Date    COLONOSCOPY  11/18/2013    [facility]    COLONOSCOPY W/ POLYPECTOMY  05/08/2024    REPEAT 5 YRS/ DR RAZA    MR HEAD ANGIO WO IV CONTRAST  07/05/2023    MR HEAD ANGIO WO IV CONTRAST 7/5/2023 [facility]    MR NECK ANGIO WO IV CONTRAST  07/05/2023    MR NECK ANGIO WO IV CONTRAST 7/5/2023 [facility]    OTHER SURGICAL HISTORY  03/05/2020    Neck surgery    OTHER SURGICAL HISTORY  03/05/2020    Cataract surgery    OTHER SURGICAL  HISTORY  03/05/2019    COLONOSCOPY REPEAT 5 YEARS/ DR CASTANON    OTHER SURGICAL HISTORY  12/23/2022    Epidural steroid injection    OTHER SURGICAL HISTORY  10/20/2022    Radiofrequency ablation    OTHER SURGICAL HISTORY  06/08/2022    Radiofrequency ablation    OTHER SURGICAL HISTORY  06/24/2022    Medial branch block    OTHER SURGICAL HISTORY  09/01/2022    Medial branch block    OTHER SURGICAL HISTORY  03/03/2022    Medial branch block    OTHER SURGICAL HISTORY  03/30/2022    Medial branch block    OTHER SURGICAL HISTORY  10/27/2020    Esophagogastroduodenoscopy    OTHER SURGICAL HISTORY  10/27/2020    Condyloma acuminata excision    OTHER SURGICAL HISTORY  08/19/2021    Epidural steroid injection    TUBAL LIGATION Bilateral 1980     Family History   Problem Relation Name Age of Onset    Diabetes Mother      Hypertension Mother      Heart attack Mother      Diabetes Father      Heart attack Father       Social History     Tobacco Use    Smoking status: Every Day     Current packs/day: 0.50     Types: Cigarettes    Smokeless tobacco: Never   Vaping Use    Vaping status: Never Used   Substance Use Topics    Alcohol use: Never    Drug use: Never       Physical Exam   ED Triage Vitals [03/07/25 1039]   Temperature Heart Rate Respirations BP   36.7 °C (98 °F) 89 18 (!) 119/101      Pulse Ox Temp Source Heart Rate Source Patient Position   99 % Temporal Monitor --      BP Location FiO2 (%)     -- --       Physical Exam  Vitals and nursing note reviewed.   Constitutional:       General: She is not in acute distress.     Appearance: Normal appearance. She is normal weight. She is not ill-appearing or toxic-appearing.   HENT:      Head: Normocephalic.      Right Ear: External ear normal.      Left Ear: External ear normal.      Nose: Nose normal.      Mouth/Throat:      Lips: Pink. No lesions.      Mouth: Mucous membranes are moist.      Pharynx: No oropharyngeal exudate.   Eyes:      General: No scleral icterus.      Conjunctiva/sclera: Conjunctivae normal.   Cardiovascular:      Rate and Rhythm: Normal rate and regular rhythm.      Pulses:           Dorsalis pedis pulses are 2+ on the right side and 2+ on the left side.        Posterior tibial pulses are 2+ on the right side and 2+ on the left side.      Heart sounds: Normal heart sounds.   Pulmonary:      Effort: Pulmonary effort is normal.      Breath sounds: Normal breath sounds and air entry.   Chest:      Chest wall: No tenderness.   Abdominal:      General: Bowel sounds are normal.      Palpations: Abdomen is soft.      Tenderness: There is no abdominal tenderness. There is no right CVA tenderness, left CVA tenderness or guarding. Negative signs include Workman's sign and McBurney's sign.   Musculoskeletal:      Right lower leg: No edema.      Left lower leg: No edema.   Skin:     General: Skin is warm.      Capillary Refill: Capillary refill takes less than 2 seconds.      Findings: No rash.   Neurological:      General: No focal deficit present.      Mental Status: She is alert and oriented to person, place, and time.      Cranial Nerves: No cranial nerve deficit or facial asymmetry.      Sensory: No sensory deficit.      Motor: No weakness.      Gait: Gait normal.   Psychiatric:         Attention and Perception: Attention and perception normal.         Mood and Affect: Mood and affect normal.         Speech: Speech normal.         Behavior: Behavior normal. Behavior is cooperative.         Thought Content: Thought content normal.         Cognition and Memory: Cognition and memory normal.         Judgment: Judgment normal.           ED Course & MDM   Diagnoses as of 03/07/25 2032   COPD exacerbation (Multi)                 No data recorded     Chela Coma Scale Score: 15 (03/07/25 1107 : Amairani Can RN)                           Medical Decision Making  Presents with cough and congestion for the past 2 days.  States she also has some burning to her chest when she  coughs hard.  Denies any fever or chills.  Cough is mostly dry with occasional sputum.  States she was smoking in her car with another person who started coughing and she thought that maybe this was where she may have picked up an illness.  She denies any chest pain or shortness of breath.    Ddx: COPD, pneumonia, influenza, COVID, other    Will obtain labs and x-ray  Patient's troponin was within normal limits.  COVID and influenza were negative.  BNP was also normal.  Patient's chemistries did show a mild hyperglycemia which is consistent with patient's diabetic state.  Magnesium was also normal.  Patient had no leukocytosis and hemoglobin was also normal.  Chest x-ray show no acute concerns at this time.  EKG was also unconcerning.  At this point I feel that patient is most likely suffering from a COPD exacerbation and would recommends steroids and antibiotics.  Patient states that she has plenty of her inhaler.  Patient declined steroids stating that it would cause issues with her blood sugar.  She states that she does not have an insulin sliding scale and only uses metformin for her diabetes and therefore will have a hard time controlling her hyperglycemia if she was placed on steroids for the next few days.  She voiced understand that this would also cause her COPD to not improve as quickly.  Patient was agreeable to antibiotics.  As patient was being discharged she then stated that she had told the nurse about an odor in her genital area.  I encouraged her to follow-up with her gynecologist in which she states she has an established relationship with.  Patient discharged home in improved stable condition to follow-up with family care provider in the next few days.    Problems Addressed:  COPD exacerbation (Multi): undiagnosed new problem with uncertain prognosis    Amount and/or Complexity of Data Reviewed  Labs: ordered. Decision-making details documented in ED Course.  Radiology: ordered and independent  interpretation performed. Decision-making details documented in ED Course.  ECG/medicine tests: ordered and independent interpretation performed. Decision-making details documented in ED Course.     Details: Myself showing normal sinus tachycardia at a ventricular rate of 103 bpm.  Normal axis.  No ST segment elevation.  WI interval 140 with a QT of 322        Procedure  Procedures     Zora Faith PA-C  03/07/25 2032

## 2025-03-10 ENCOUNTER — TELEPHONE (OUTPATIENT)
Age: 70
End: 2025-03-10
Payer: MEDICARE

## 2025-03-10 NOTE — TELEPHONE ENCOUNTER
Pt states she was in ER for COPD exab, needs follow up appt but has NPV on 4/10/25.     Spoke with Dr. Yip, asked her opinion, states she is ok with the NPV on 4/10/25 if pt is stable.     Attempt to call pt, phone not working. Called Emergency contact Yessenia, left message stating that I was trying to get a hold of patient and this is not an emergency.

## 2025-03-19 ENCOUNTER — APPOINTMENT (OUTPATIENT)
Dept: PRIMARY CARE | Facility: CLINIC | Age: 70
End: 2025-03-19
Payer: MEDICARE

## 2025-03-28 ENCOUNTER — OFFICE VISIT (OUTPATIENT)
Dept: URGENT CARE | Facility: CLINIC | Age: 70
End: 2025-03-28
Payer: MEDICARE

## 2025-03-28 VITALS
TEMPERATURE: 97.7 F | HEIGHT: 65 IN | BODY MASS INDEX: 17.99 KG/M2 | DIASTOLIC BLOOD PRESSURE: 71 MMHG | RESPIRATION RATE: 18 BRPM | HEART RATE: 103 BPM | OXYGEN SATURATION: 98 % | SYSTOLIC BLOOD PRESSURE: 127 MMHG | WEIGHT: 108 LBS

## 2025-03-28 DIAGNOSIS — J20.9 ACUTE BRONCHITIS, UNSPECIFIED ORGANISM: Primary | ICD-10-CM

## 2025-03-28 DIAGNOSIS — J44.9 CHRONIC OBSTRUCTIVE PULMONARY DISEASE, UNSPECIFIED COPD TYPE (MULTI): ICD-10-CM

## 2025-03-28 PROCEDURE — 99213 OFFICE O/P EST LOW 20 MIN: CPT | Performed by: NURSE PRACTITIONER

## 2025-03-28 RX ORDER — AZITHROMYCIN 250 MG/1
TABLET, FILM COATED ORAL
Qty: 6 TABLET | Refills: 0 | Status: SHIPPED | OUTPATIENT
Start: 2025-03-28

## 2025-03-28 NOTE — PROGRESS NOTES
[facility]  Hyacinth Jesus, APRN-CNP     Visit Note - 3/28/2025 3:50 PM   This note was generated with voice recognition software and may contain errors including spelling, grammar, syntax, and misrecognization of what was dictated.    Patient: Nanette Elliott, MRN: [id number], 69 y.o., female   PCP: Zora Yip MD  ------------------------------------  ALLERGIES:   Allergies   Allergen Reactions    Sulfamethoxazole-Trimethoprim Rash    House Dust Unknown    Baclofen Other     vomiting        CURRENT MEDICATIONS:   Current Outpatient Medications   Medication Instructions    acetaminophen (TYLENOL) 500 mg, Every 6 hours PRN    albuterol 90 mcg/actuation inhaler 4 puffs, inhalation, Every 6 hours PRN    alendronate (FOSAMAX) 70 mg, oral, Every 7 days, Take in the morning with a full glass of water, on an empty stomach, and do not take anything else by mouth or lie down for the next 30 min.    Arthritis Pain, diclofenac, 1 % gel Apply 4.5 inches (4 grams) topically 4 times a day as needed (pain, stiffness, swelling).    azithromycin (Zithromax Z-Marco) 250 mg tablet Take 2 tablets by mouth at once on day 1, then 1 tablet once a day on days 2-5. Take with a meal.    ezetimibe (ZETIA) 10 mg, oral, Daily    fluticasone (Flonase) 50 mcg/actuation nasal spray 2 sprays, Each Nostril, Daily    FreeStyle Lite Strips strip TEST BLOOD SUGARS 1-2 TIMES PER DAY    gabapentin (Neurontin) 300 mg capsule TAKE 2 CAPS Po IN THE MORNING, 2 CAPS po in THE AFTERNOON, and 3-4 CAPS po AT BEDTIME    ketoconazole (NIZOral) 2 % shampoo Topical, 2 times weekly    loratadine (Claritin) 10 mg tablet 1 tablet, Daily    medroxyPROGESTERone (PROVERA) 10 mg, oral, Daily    melatonin 10 mg capsule 1 capsule, Nightly    metFORMIN XR (GLUCOPHAGE-XR) 1,000 mg, oral, 2 times daily (morning and late afternoon)    predniSONE (Deltasone) 20 mg tablet Take 3 tabs (60mg) daily for 3 days, then take 2 tabs (40mg) daily for 3 days, then  take 1 tab (20mg) daily for 3 days.    valsartan (DIOVAN) 40 mg, oral, Daily     ------------------------------------  PAST MEDICAL HX:  Patient Active Problem List   Diagnosis    Abnormal CT scan, chest    Carotid artery stenosis    Compressed cervical disc    Cigarette smoker    Diabetes (Multi)    COPD (chronic obstructive pulmonary disease) (Multi)    Mixed hyperlipidemia    HTN (hypertension)    Dizziness    Statin intolerance    Protein-calorie malnutrition, unspecified severity (Multi)    Atherosclerosis of aorta (CMS-HCC)    Gastroesophageal reflux disease    Diabetic neuropathy associated with type 2 diabetes mellitus (Multi)    Chronic neck pain    Primary osteoarthritis of right knee    Sprain of right knee/leg      SURGICAL HX:  Past Surgical History:   Procedure Laterality Date    COLONOSCOPY  11/18/2013    [facility]    COLONOSCOPY W/ POLYPECTOMY  05/08/2024    REPEAT 5 YRS/ DR RAZA    MR HEAD ANGIO WO IV CONTRAST  07/05/2023    MR HEAD ANGIO WO IV CONTRAST 7/5/2023 [facility]    MR NECK ANGIO WO IV CONTRAST  07/05/2023    MR NECK ANGIO WO IV CONTRAST 7/5/2023 [facility]    OTHER SURGICAL HISTORY  03/05/2020    Neck surgery    OTHER SURGICAL HISTORY  03/05/2020    Cataract surgery    OTHER SURGICAL HISTORY  03/05/2019    COLONOSCOPY REPEAT 5 YEARS/ DR CASTANON    OTHER SURGICAL HISTORY  12/23/2022    Epidural steroid injection    OTHER SURGICAL HISTORY  10/20/2022    Radiofrequency ablation    OTHER SURGICAL HISTORY  06/08/2022    Radiofrequency ablation    OTHER SURGICAL HISTORY  06/24/2022    Medial branch block    OTHER SURGICAL HISTORY  09/01/2022    Medial branch block    OTHER SURGICAL HISTORY  03/03/2022    Medial branch block    OTHER SURGICAL HISTORY  03/30/2022    Medial branch block    OTHER SURGICAL HISTORY  10/27/2020    Esophagogastroduodenoscopy    OTHER SURGICAL HISTORY  10/27/2020    Condyloma acuminata excision    OTHER SURGICAL HISTORY  08/19/2021    Epidural steroid injection     "TUBAL LIGATION Bilateral 1980      FAMILY HX:   No pertinent history.   SOCIAL HX:    reports that she has been smoking cigarettes. She has never used smokeless tobacco.  ------------------------------------  CHIEF COMPLAINT:   Chief Complaint   Patient presents with    URI     Cough, congestion, weak, fatigued x 3 weeks      HISTORY OF PRESENT ILLNESS: The history was obtained from {historian:[email or web address]}. Nanette is a 69 y.o. female, who presents with a chief complaint of     REVIEW OF SYSTEMS:  10 systems reviewed negative with exception of history of present illness as listed above.    TODAY'S VITALS: /71   Pulse 103   Temp 36.5 °C (97.7 °F)   Resp 18   Ht 1.651 m (5' 5\")   Wt 49 kg (108 lb)   SpO2 98%   BMI 17.97 kg/m²     PHYSICAL EXAMINATION:      ------------------------------------  Medical Decision Making  LABORATORY or RADIOLOGICAL IMAGING ORDERS/RESULTS:     IMPRESSION/PLAN:  Course: Worsening; stable    1. Acute bronchitis, unspecified organism (Primary)  2. Chronic obstructive pulmonary disease, unspecified COPD type (Multi)  - azithromycin (Zithromax Z-Marco) 250 mg tablet; Take 2 tablets by mouth at once on day 1, then 1 tablet once a day on days 2-5. Take with a meal.  Dispense: 6 tablet; Refill: 0    Contacted pharmacy to inquire about gabapentin - they state patient has a refill remaining, and they are planning to fill it today. Patient notified/aware.      ZAHIDA Holt-CNP   Advanced Practice Provider  [facility]  " equal, round and reactive to light. No conjunctival erythema; no scleral icterus.   HENT: No frontal or maxillary sinus tenderness; has mild audible nasal congestion. Bilat ear canals clear/unremarkable; TM's unremarkable. Nasal mucosa mildly boggy and edematous. Airway patent, oral mucosa moist. Posterior pharynx mildly injected but without vesicles or oropharyngeal exudate. Uvula is midline. Trachea is midline. Managing oral secretions without difficulty.  Neck:  Supple. Tender, mobile anterior cervical lymphadenopathy bilat.  Respiratory:  Lungs with few scattered rhonchi that clear with cough; no wheezing or rales. Respirations unlabored, Breath sounds are equal, Symmetrical chest wall expansion. + semi-productive cough noted.   Cardiovascular:  Normal rate, Regular rhythm. Normal S1S2. No m/r/g. No peripheral edema.   Gastrointestinal:  Soft, non-tender, non-distended; no palpable masses or organomegaly. Bowel sounds normoactive.  Musculoskeletal:  Grossly normal  Integumentary:  Pink, warm, dry, and intact. No rashes or skin discoloration appreciated.    Neurologic:  Alert and oriented, no focal deficits, no motor or sensory deficits.    Cognition and Speech:  Oriented, Speech clear and coherent.    Psychiatric:  Cooperative, Appropriate mood & affect.      ------------------------------------  Medical Decision Making  LABORATORY or RADIOLOGICAL IMAGING ORDERS/RESULTS:   None    IMPRESSION/PLAN:  Course: Worsening; stable    1. Acute bronchitis, unspecified organism (Primary)  2. Chronic obstructive pulmonary disease, unspecified COPD type (Multi)  - azithromycin (Zithromax Z-Marco) 250 mg tablet; Take 2 tablets by mouth at once on day 1, then 1 tablet once a day on days 2-5. Take with a meal.  Dispense: 6 tablet; Refill: 0    No red flags on exam. Patient declined tests for COVID/influenza/RSV. Symptoms remain consistent with acute bronchitis, although reviewed other potential etiologies. Reports she improved  some after a course of Doxycycline, but symptoms still concerning for residual infection. Due to severity/duration of symptoms, will start Zpak today. Discussed prednisone, but patient requesting to defer, as she feels it has triggered severe hyperglycemic episodes for her in the past. Encouraged to continue use of albuterol inhaler as needed. Instructed to push fluids, rest, and to use appropriate over the counter medications as needed for management of symptoms - discussed that plain Mucinex may be helpful.  Reviewed red flags to monitor for, counseled on potential adverse reactions of treatments, expectations for improvement in sxs, and advised to follow-up with primary care provider in 2-3 days if symptoms persist, or sooner if worsening or if any additional concerns/red flags develop.  Patient verbalized understanding and agreed with plan of care; questions were encouraged and answered.    Contacted pharmacy to inquire about gabapentin - they state patient has a refill remaining, and they are planning to fill it today. Patient notified/aware.      ZAHIDA Holt-CNP   Advanced Practice Provider  [facility]

## 2025-04-10 ENCOUNTER — APPOINTMENT (OUTPATIENT)
Age: 70
End: 2025-04-10
Payer: MEDICARE

## 2025-04-10 VITALS
OXYGEN SATURATION: 95 % | SYSTOLIC BLOOD PRESSURE: 128 MMHG | HEART RATE: 91 BPM | DIASTOLIC BLOOD PRESSURE: 80 MMHG | BODY MASS INDEX: 17.66 KG/M2 | HEIGHT: 65 IN | WEIGHT: 106 LBS

## 2025-04-10 DIAGNOSIS — F17.210 CIGARETTE SMOKER: ICD-10-CM

## 2025-04-10 DIAGNOSIS — E11.9 TYPE 2 DIABETES MELLITUS WITHOUT COMPLICATION, WITHOUT LONG-TERM CURRENT USE OF INSULIN: ICD-10-CM

## 2025-04-10 DIAGNOSIS — Z76.89 ENCOUNTER TO ESTABLISH CARE: Primary | ICD-10-CM

## 2025-04-10 DIAGNOSIS — Z12.31 SCREENING MAMMOGRAM FOR BREAST CANCER: ICD-10-CM

## 2025-04-10 DIAGNOSIS — J43.2 CENTRILOBULAR EMPHYSEMA (MULTI): ICD-10-CM

## 2025-04-10 PROCEDURE — 3079F DIAST BP 80-89 MM HG: CPT | Performed by: STUDENT IN AN ORGANIZED HEALTH CARE EDUCATION/TRAINING PROGRAM

## 2025-04-10 PROCEDURE — 99214 OFFICE O/P EST MOD 30 MIN: CPT | Performed by: STUDENT IN AN ORGANIZED HEALTH CARE EDUCATION/TRAINING PROGRAM

## 2025-04-10 PROCEDURE — 99406 BEHAV CHNG SMOKING 3-10 MIN: CPT | Performed by: STUDENT IN AN ORGANIZED HEALTH CARE EDUCATION/TRAINING PROGRAM

## 2025-04-10 PROCEDURE — 3074F SYST BP LT 130 MM HG: CPT | Performed by: STUDENT IN AN ORGANIZED HEALTH CARE EDUCATION/TRAINING PROGRAM

## 2025-04-10 PROCEDURE — 1159F MED LIST DOCD IN RCRD: CPT | Performed by: STUDENT IN AN ORGANIZED HEALTH CARE EDUCATION/TRAINING PROGRAM

## 2025-04-10 PROCEDURE — G2211 COMPLEX E/M VISIT ADD ON: HCPCS | Performed by: STUDENT IN AN ORGANIZED HEALTH CARE EDUCATION/TRAINING PROGRAM

## 2025-04-10 PROCEDURE — 1160F RVW MEDS BY RX/DR IN RCRD: CPT | Performed by: STUDENT IN AN ORGANIZED HEALTH CARE EDUCATION/TRAINING PROGRAM

## 2025-04-10 PROCEDURE — 4010F ACE/ARB THERAPY RXD/TAKEN: CPT | Performed by: STUDENT IN AN ORGANIZED HEALTH CARE EDUCATION/TRAINING PROGRAM

## 2025-04-10 PROCEDURE — 1157F ADVNC CARE PLAN IN RCRD: CPT | Performed by: STUDENT IN AN ORGANIZED HEALTH CARE EDUCATION/TRAINING PROGRAM

## 2025-04-10 PROCEDURE — 3008F BODY MASS INDEX DOCD: CPT | Performed by: STUDENT IN AN ORGANIZED HEALTH CARE EDUCATION/TRAINING PROGRAM

## 2025-04-10 RX ORDER — BUDESONIDE AND FORMOTEROL FUMARATE DIHYDRATE 80; 4.5 UG/1; UG/1
2 AEROSOL RESPIRATORY (INHALATION)
Qty: 10.2 G | Refills: 5 | Status: SHIPPED | OUTPATIENT
Start: 2025-04-10 | End: 2026-04-10

## 2025-04-10 ASSESSMENT — PATIENT HEALTH QUESTIONNAIRE - PHQ9
SUM OF ALL RESPONSES TO PHQ9 QUESTIONS 1 AND 2: 0
2. FEELING DOWN, DEPRESSED OR HOPELESS: NOT AT ALL
1. LITTLE INTEREST OR PLEASURE IN DOING THINGS: NOT AT ALL

## 2025-04-10 NOTE — PROGRESS NOTES
Subjective:  Nanette Elliott is a 69 y.o. female who presents to clinic today for Establish Care      She comes in today to establish a new doctor. Previously seeing Dr. Harper.     Hx of diabetes - taking metformin  Hypertension - taking valsartan  Hyperlipidemia - cholesterol, unfortunately has been unable to tolerate statin in the past  COPD - just albuterol, has a nebulizer, her breathing seems okay to her, only using albuterol every few days  Tobacco use disorder- she's tried chantix to quit previously, has tried patches, had nightmares  Arthritis/scoliosis/cervical disc disease - taking gabapentin, was previously prescribed by last PCP   GERD  Osteoporosis: fosamax, has been on it for 3-4 months  Has a hx of genital warts and was diagnosed with cancer. Has been on provera since then. She's unsure why she is on this at this time. Had laser and surgical excision.    Review of Systems    Assessment/Plan:  Nanette Elliott is a 69 y.o. female with a history of HTN, diabetes, HL, COPD, TUD, chronic pain, GERD, osteoporosis who presents to clinic today to address the following issues:   1. Encounter to establish care        2. Type 2 diabetes mellitus without complication, without long-term current use of insulin  Hemoglobin A1C    Albumin-Creatinine Ratio, Urine Random    Comprehensive Metabolic Panel    CBC    Hemoglobin A1C    Albumin-Creatinine Ratio, Urine Random    Comprehensive Metabolic Panel    CBC      3. Centrilobular emphysema (Multi)  budesonide-formoterol (Symbicort) 80-4.5 mcg/actuation inhaler      4. Cigarette smoker        5. Screening mammogram for breast cancer  BI mammo bilateral screening tomosynthesis      Diabetes:  - most recent hgb A1c 6.4, currently only on metformin  - recheck labs as its been >6 months    Emphysema:  - not on any controller inhaler and having to use albuterol regularly  - start symbicort BID    I reviewed her history and patient will also make appt with  "gynecologist for recheck of vulvar lesions as well as to discuss why she is taking provera as she is currently unsure  We spent 3 minutes discussing tobacco cessation and proper utilization of nicotine replacement patches.  Problem List Items Addressed This Visit       Cigarette smoker    Diabetes (Multi)    Relevant Orders    Hemoglobin A1C    Albumin-Creatinine Ratio, Urine Random    Comprehensive Metabolic Panel    CBC    COPD (chronic obstructive pulmonary disease) (Multi)    Relevant Medications    budesonide-formoterol (Symbicort) 80-4.5 mcg/actuation inhaler     Other Visit Diagnoses       Encounter to establish care    -  Primary    Screening mammogram for breast cancer        Relevant Orders    BI mammo bilateral screening tomosynthesis            There are no Patient Instructions on file for this visit.    Follow up: 6 months MWV    Return precautions discussed.  An After Visit Summary was given to the patient.  All questions were answered and patient in agreement with plan.    Objective:  /80   Pulse 91   Ht 1.651 m (5' 5\")   Wt 48.1 kg (106 lb)   SpO2 95%   BMI 17.64 kg/m²     Physical Exam  Vitals and nursing note reviewed.   Constitutional:       General: She is not in acute distress.     Appearance: Normal appearance.   HENT:      Head: Normocephalic and atraumatic.      Mouth/Throat:      Mouth: Mucous membranes are moist.   Eyes:      General: No scleral icterus.        Right eye: No discharge.         Left eye: No discharge.      Extraocular Movements: Extraocular movements intact.      Conjunctiva/sclera: Conjunctivae normal.   Cardiovascular:      Rate and Rhythm: Normal rate and regular rhythm.   Pulmonary:      Effort: No respiratory distress.      Breath sounds: Wheezing present.      Comments: Distant breath sounds  Skin:     General: Skin is warm and dry.   Neurological:      General: No focal deficit present.      Mental Status: She is alert and oriented to person, place, and " time.   Psychiatric:         Attention and Perception: Attention normal.         Mood and Affect: Mood normal.         Speech: Speech normal.         Behavior: Behavior normal.         Cognition and Memory: Cognition and memory normal.         Judgment: Judgment normal.         I spent 35 minutes in total time for this visit including all related clinical activities before, during, and after the visit excluding other billable activities/procedure time.     Zora Yip MD

## 2025-04-15 ENCOUNTER — HOSPITAL ENCOUNTER (OUTPATIENT)
Dept: RADIOLOGY | Facility: HOSPITAL | Age: 70
Discharge: HOME | End: 2025-04-15
Payer: MEDICARE

## 2025-04-15 DIAGNOSIS — Z12.31 SCREENING MAMMOGRAM FOR BREAST CANCER: ICD-10-CM

## 2025-04-15 PROCEDURE — 77067 SCR MAMMO BI INCL CAD: CPT | Performed by: RADIOLOGY

## 2025-04-15 PROCEDURE — 77063 BREAST TOMOSYNTHESIS BI: CPT | Performed by: RADIOLOGY

## 2025-04-15 PROCEDURE — 77067 SCR MAMMO BI INCL CAD: CPT

## 2025-04-27 LAB
ALBUMIN SERPL-MCNC: 4.2 G/DL (ref 3.6–5.1)
ALBUMIN/CREAT UR: 2 MG/G CREAT
ALP SERPL-CCNC: 15 U/L (ref 37–153)
ALT SERPL-CCNC: 16 U/L (ref 6–29)
ANION GAP SERPL CALCULATED.4IONS-SCNC: 8 MMOL/L (CALC) (ref 7–17)
AST SERPL-CCNC: 15 U/L (ref 10–35)
BILIRUB SERPL-MCNC: 0.4 MG/DL (ref 0.2–1.2)
BUN SERPL-MCNC: 19 MG/DL (ref 7–25)
CALCIUM SERPL-MCNC: 8.9 MG/DL (ref 8.6–10.4)
CHLORIDE SERPL-SCNC: 105 MMOL/L (ref 98–110)
CO2 SERPL-SCNC: 27 MMOL/L (ref 20–32)
CREAT SERPL-MCNC: 0.84 MG/DL (ref 0.5–1.05)
CREAT UR-MCNC: 90 MG/DL (ref 20–275)
EGFRCR SERPLBLD CKD-EPI 2021: 76 ML/MIN/1.73M2
ERYTHROCYTE [DISTWIDTH] IN BLOOD BY AUTOMATED COUNT: 12.6 % (ref 11–15)
EST. AVERAGE GLUCOSE BLD GHB EST-MCNC: 143 MG/DL
EST. AVERAGE GLUCOSE BLD GHB EST-SCNC: 7.9 MMOL/L
GLUCOSE SERPL-MCNC: 111 MG/DL (ref 65–99)
HBA1C MFR BLD: 6.6 %
HCT VFR BLD AUTO: 39.5 % (ref 35–45)
HGB BLD-MCNC: 13.3 G/DL (ref 11.7–15.5)
MCH RBC QN AUTO: 30.9 PG (ref 27–33)
MCHC RBC AUTO-ENTMCNC: 33.7 G/DL (ref 32–36)
MCV RBC AUTO: 91.6 FL (ref 80–100)
MICROALBUMIN UR-MCNC: 0.2 MG/DL
PLATELET # BLD AUTO: 272 THOUSAND/UL (ref 140–400)
PMV BLD REES-ECKER: 10.6 FL (ref 7.5–12.5)
POTASSIUM SERPL-SCNC: 4.7 MMOL/L (ref 3.5–5.3)
PROT SERPL-MCNC: 6.2 G/DL (ref 6.1–8.1)
RBC # BLD AUTO: 4.31 MILLION/UL (ref 3.8–5.1)
SODIUM SERPL-SCNC: 140 MMOL/L (ref 135–146)
WBC # BLD AUTO: 6.6 THOUSAND/UL (ref 3.8–10.8)

## 2025-05-16 ENCOUNTER — APPOINTMENT (OUTPATIENT)
Facility: CLINIC | Age: 70
End: 2025-05-16
Payer: MEDICARE

## 2025-05-16 VITALS
DIASTOLIC BLOOD PRESSURE: 68 MMHG | HEIGHT: 65 IN | SYSTOLIC BLOOD PRESSURE: 122 MMHG | BODY MASS INDEX: 17.63 KG/M2 | WEIGHT: 105.8 LBS

## 2025-05-16 DIAGNOSIS — R30.0 DYSURIA: ICD-10-CM

## 2025-05-16 DIAGNOSIS — N89.8 VAGINAL DISCHARGE: Primary | ICD-10-CM

## 2025-05-16 DIAGNOSIS — N95.2 VAGINAL ATROPHY: ICD-10-CM

## 2025-05-16 LAB
POC APPEARANCE, URINE: CLEAR
POC BILIRUBIN, URINE: NEGATIVE
POC BLOOD, URINE: NEGATIVE
POC CLUE CELL WET PREP: ABNORMAL
POC COLOR, URINE: YELLOW
POC GLUCOSE, URINE: NEGATIVE MG/DL
POC KETONES, URINE: NEGATIVE MG/DL
POC LEUKOCYTES, URINE: NEGATIVE
POC NITRITE,URINE: NEGATIVE
POC PH, URINE: 6 PH
POC PROTEIN, URINE: NEGATIVE MG/DL
POC SPECIFIC GRAVITY, URINE: 1.02
POC TRICHOMONAS WET PREP: ABNORMAL
POC UROBILINOGEN, URINE: 0.2 EU/DL
POC WBC WET PREP: ABNORMAL
POC YEAST CELLS WET PREP: ABNORMAL

## 2025-05-16 PROCEDURE — 1159F MED LIST DOCD IN RCRD: CPT | Performed by: OBSTETRICS & GYNECOLOGY

## 2025-05-16 PROCEDURE — 99213 OFFICE O/P EST LOW 20 MIN: CPT | Performed by: OBSTETRICS & GYNECOLOGY

## 2025-05-16 PROCEDURE — 3074F SYST BP LT 130 MM HG: CPT | Performed by: OBSTETRICS & GYNECOLOGY

## 2025-05-16 PROCEDURE — 81003 URINALYSIS AUTO W/O SCOPE: CPT | Performed by: OBSTETRICS & GYNECOLOGY

## 2025-05-16 PROCEDURE — 3008F BODY MASS INDEX DOCD: CPT | Performed by: OBSTETRICS & GYNECOLOGY

## 2025-05-16 PROCEDURE — 1160F RVW MEDS BY RX/DR IN RCRD: CPT | Performed by: OBSTETRICS & GYNECOLOGY

## 2025-05-16 PROCEDURE — 4010F ACE/ARB THERAPY RXD/TAKEN: CPT | Performed by: OBSTETRICS & GYNECOLOGY

## 2025-05-16 PROCEDURE — 87210 SMEAR WET MOUNT SALINE/INK: CPT | Performed by: OBSTETRICS & GYNECOLOGY

## 2025-05-16 PROCEDURE — 1125F AMNT PAIN NOTED PAIN PRSNT: CPT | Performed by: OBSTETRICS & GYNECOLOGY

## 2025-05-16 PROCEDURE — 3078F DIAST BP <80 MM HG: CPT | Performed by: OBSTETRICS & GYNECOLOGY

## 2025-05-16 PROCEDURE — 99459 PELVIC EXAMINATION: CPT | Performed by: OBSTETRICS & GYNECOLOGY

## 2025-05-16 RX ORDER — ESTRADIOL 0.1 MG/G
2 CREAM VAGINAL NIGHTLY
Qty: 34 G | Refills: 3 | Status: SHIPPED | OUTPATIENT
Start: 2025-05-16 | End: 2026-05-16

## 2025-05-16 ASSESSMENT — PAIN SCALES - GENERAL: PAINLEVEL_OUTOF10: 1

## 2025-05-16 NOTE — PROGRESS NOTES
Nanette Elliott is a 69 y.o. year old female patient.  PCP = Zora Yip MD    Chief Complaint   Patient presents with    Vaginitis/Bacterial Vaginosis     Pt here today for vaginal odor and discharge  pt c/o itching and burning x2 months  pt states she was on antibiotics, but symptoms had already started. Pt taking provera and stated Dr Yip wanted to know why she was initially started on medication       HPI   Presents stating that she has noticed vaginal discharge with odor over the last 2 months.  She had been on antibiotics a month ago for bronchitis but the cyst symptoms were present prior to the antibiotic.  She has been on Provera for several years and has questions regarding the medication.  Denies any prior or current episodes of postmenopausal bleeding.  She also has had some burning with urination recently.    OB History          2    Para   2    Term   2       0    AB   0    Living   2         SAB   0    IAB   0    Ectopic   0    Multiple   0    Live Births   2                 Medical History[1]    Surgical History[2]    Review of Systems:   Constitutional: No fever or chills  Respiratory: No shortness of breath, or cough  Cardiovascular: No chest pain or syncope  Breasts: No breast pain, no masses, no nipple discharge  Gastrointestinal: No nausea, vomiting, or diarrhea, no abdominal pain  Genitourinary: No dysuria or frequency  Gynecology: Negative except as noted in history of present illness  All other: All other systems reviewed and negative for complaint    Medication Documentation Review Audit       Reviewed by Alli Nicole MD (Physician) on 25 at 1144      Medication Order Taking? Sig Documenting Provider Last Dose Status   acetaminophen (Tylenol) 500 mg tablet [id number]  Take 1 tablet (500 mg) by mouth every 6 hours if needed for mild pain (1 - 3). Historical Provider, MD  Active   albuterol 90 mcg/actuation inhaler [id number]  Inhale 4 puffs every 6 hours if needed  for wheezing. Hyacinth Harper DO  Active   alendronate (Fosamax) 70 mg tablet [id number] No Take 1 tablet (70 mg) by mouth every 7 days. Take in the morning with a full glass of water, on an empty stomach, and do not take anything else by mouth or lie down for the next 30 min. Hyacinth Harper DO Past Week Active   Arthritis Pain, diclofenac, 1 % gel [id number]  Apply 4.5 inches (4 grams) topically 4 times a day as needed (pain, stiffness, swelling). Malini Benítez, APRN-CNP  Active   budesonide-formoterol (Symbicort) 80-4.5 mcg/actuation inhaler [id number]  Inhale 2 puffs 2 times a day. Rinse mouth with water after use to reduce aftertaste and incidence of candidiasis. Do not swallow. Zora Yip MD  Active   ezetimibe (Zetia) 10 mg tablet [id number] No Take 1 tablet (10 mg) by mouth once daily. Hyacinth Harper DO 3/7/2025 Morning Active   fluticasone (Flonase) 50 mcg/actuation nasal spray [id number]  Administer 2 sprays into each nostril once daily. Hyacinth Harper DO  Active   FreeStyle Lite Strips strip [id number] No TEST BLOOD SUGARS 1-2 TIMES PER DAY Historical Provider, MD 3/7/2025 Morning Active   gabapentin (Neurontin) 300 mg capsule [id number] No TAKE 2 CAPS Po IN THE MORNING, 2 CAPS po in THE AFTERNOON, and 3-4 CAPS po AT BEDTIME Hyacinth Harper DO 3/7/2025 Morning Active   ketoconazole (NIZOral) 2 % shampoo [id number]  Apply topically 2 times a week. Hyacinth Harper DO  Active   loratadine (Claritin) 10 mg tablet [id number] No Take 1 tablet (10 mg) by mouth once daily. Sia Provider, MD 3/7/2025 Morning Active   medroxyPROGESTERone (Provera) 10 mg tablet [id number] No Take 1 tablet (10 mg) by mouth once daily. Hyacinth Harper DO 3/7/2025 Morning Active   melatonin 10 mg capsule [id number] No Take 1 capsule (10 mg) by mouth once daily at bedtime. Historical Provider, MD 3/6/2025 Evening Active   metFORMIN  mg 24 hr tablet [id number] No Take 2 tablets (1,000 mg) by mouth 2  "times daily (morning and late afternoon). Hyacinth Harper DO 3/7/2025 Morning Active   valsartan (Diovan) 40 mg tablet [id number] No Take 1 tablet (40 mg) by mouth once daily. Hyacinth Harper DO 3/6/2025 Evening Active                     /68   Ht 1.651 m (5' 5\")   Wt 48 kg (105 lb 12.8 oz)   BMI 17.61 kg/m²     PHYSICAL EXAMINATION:  Chaperone present for exam: Paulina Ferrell MA ([place])  Well-developed, well nourished, in no acute distress, alert and oriented x three, is pleasant and cooperative.  HEENT: Clear. Pupils equal, round and reactive to light and accommodation. Extraocular muscles are intact. Oral mucosa pink without exudate.   NECK: No lymphadenopathy, no thyromegaly.  LUNGS: Clear bilaterally.  HEART: Regular rate and rhythm without murmurs.  ABDOMEN: Normoactive bowel sounds, soft and nontender, no guarding or rebound tenderness, no CVA tenderness.  EXTREMITIES: No clubbing, cyanosis or edema.  NEUROLOGIC:  Cranial nerves II-XII grossly intact.  :  Normal external female genitalia, normal vulva, normal vagina. Normal urethral meatus, urethra and bladder. Normal appearing cervix. Normal-sized uterus, no adnexal masses or tenderness.  Wet prep from the vagina was negative for bacterial vaginosis trichomonas or yeast.  Noted parabasal cells and white blood cells present.    Results for orders placed or performed in visit on 05/16/25 (from the past 24 hours)   POCT Wet Mount manually resulted   Result Value Ref Range    POC Trichomonas Wet Prep None Seen None Seen    POC WBC Wet Prep 11-20 (A) NONE SEEN    POC Clue Cell Wet Prep None Seen None Seen    POC Yeast Cells Wet Prep None Seen None Seen   POCT UA Automated manually resulted   Result Value Ref Range    POC Color, Urine Yellow Straw, Yellow, Light-Yellow    POC Appearance, Urine Clear Clear    POC Glucose, Urine NEGATIVE NEGATIVE mg/dl    POC Bilirubin, Urine NEGATIVE NEGATIVE    POC Ketones, Urine NEGATIVE NEGATIVE mg/dl    POC " Specific Gravity, Urine 1.020 1.005 - 1.035    POC Blood, Urine NEGATIVE NEGATIVE    POC PH, Urine 6.0 No Reference Range Established PH    POC Protein, Urine NEGATIVE NEGATIVE mg/dl    POC Urobilinogen, Urine 0.2 0.2, 1.0 EU/DL    Poc Nitrite, Urine NEGATIVE NEGATIVE    POC Leukocytes, Urine NEGATIVE NEGATIVE        Problem List Items Addressed This Visit    None  Visit Diagnoses         Vaginal discharge    -  Primary    Relevant Orders    POCT Wet Mount manually resulted      Dysuria          Vaginal atrophy        Relevant Medications    estradiol (Estrace) 0.01 % (0.1 mg/gram) vaginal cream             Provider Impression:  1.  Vaginal discharge  2.  Dysuria  3.  Vaginal atrophy  Patient informed that the urinalysis is negative for bladder infection.  Wet prep does not show evidence of a bacterial infection.  It is felt that the vaginal discharge is related to menopausal changes resulting in atrophy of the vagina.  Would recommend starting her on an estrogen vaginal cream.  Patient to return as previously scheduled.         [1]   Past Medical History:  Diagnosis Date    Asymptomatic menopausal state     History of menopause    COPD (chronic obstructive pulmonary disease) (Multi)     Diabetes (Multi)     Encounter for full-term uncomplicated delivery     Normal vaginal delivery    Encounter for gynecological examination (general) (routine) without abnormal findings 11/05/2021    Pap test, as part of routine gynecological examination    Hearing disorder     Hypertension     Other conditions influencing health status     Menstruation    Personal history of other medical treatment     History of screening mammography   [2]   Past Surgical History:  Procedure Laterality Date    COLONOSCOPY  11/18/2013    [facility]    COLONOSCOPY W/ POLYPECTOMY  05/08/2024    REPEAT 5 YRS/ DR RAZA    MR HEAD ANGIO WO IV CONTRAST  07/05/2023    MR HEAD ANGIO WO IV CONTRAST 7/5/2023 [facility]    MR NECK ANGIO WO IV CONTRAST   07/05/2023    MR NECK ANGIO WO IV CONTRAST 7/5/2023 [facility]    OTHER SURGICAL HISTORY  03/05/2020    Neck surgery    OTHER SURGICAL HISTORY  03/05/2020    Cataract surgery    OTHER SURGICAL HISTORY  03/05/2019    COLONOSCOPY REPEAT 5 YEARS/ DR CASTANON    OTHER SURGICAL HISTORY  12/23/2022    Epidural steroid injection    OTHER SURGICAL HISTORY  10/20/2022    Radiofrequency ablation    OTHER SURGICAL HISTORY  06/08/2022    Radiofrequency ablation    OTHER SURGICAL HISTORY  06/24/2022    Medial branch block    OTHER SURGICAL HISTORY  09/01/2022    Medial branch block    OTHER SURGICAL HISTORY  03/03/2022    Medial branch block    OTHER SURGICAL HISTORY  03/30/2022    Medial branch block    OTHER SURGICAL HISTORY  10/27/2020    Esophagogastroduodenoscopy    OTHER SURGICAL HISTORY  10/27/2020    Condyloma acuminata excision    OTHER SURGICAL HISTORY  08/19/2021    Epidural steroid injection    TUBAL LIGATION Bilateral 1980

## 2025-06-19 ENCOUNTER — APPOINTMENT (OUTPATIENT)
Dept: ORTHOPEDIC SURGERY | Facility: CLINIC | Age: 70
End: 2025-06-19
Payer: MEDICARE

## 2025-06-19 ENCOUNTER — HOSPITAL ENCOUNTER (OUTPATIENT)
Dept: RADIOLOGY | Facility: CLINIC | Age: 70
Discharge: HOME | End: 2025-06-19
Payer: MEDICARE

## 2025-06-19 ENCOUNTER — HOSPITAL ENCOUNTER (OUTPATIENT)
Dept: RADIOLOGY | Facility: EXTERNAL LOCATION | Age: 70
Discharge: HOME | End: 2025-06-19

## 2025-06-19 DIAGNOSIS — M17.11 PRIMARY OSTEOARTHRITIS OF RIGHT KNEE: ICD-10-CM

## 2025-06-19 PROCEDURE — 1157F ADVNC CARE PLAN IN RCRD: CPT | Performed by: SPECIALIST

## 2025-06-19 PROCEDURE — 4010F ACE/ARB THERAPY RXD/TAKEN: CPT | Performed by: SPECIALIST

## 2025-06-19 PROCEDURE — 1159F MED LIST DOCD IN RCRD: CPT | Performed by: SPECIALIST

## 2025-06-19 PROCEDURE — 73564 X-RAY EXAM KNEE 4 OR MORE: CPT | Mod: RIGHT SIDE | Performed by: RADIOLOGY

## 2025-06-19 PROCEDURE — 99213 OFFICE O/P EST LOW 20 MIN: CPT | Performed by: SPECIALIST

## 2025-06-19 PROCEDURE — 1125F AMNT PAIN NOTED PAIN PRSNT: CPT | Performed by: SPECIALIST

## 2025-06-19 PROCEDURE — 73564 X-RAY EXAM KNEE 4 OR MORE: CPT | Mod: RT

## 2025-06-19 PROCEDURE — 1160F RVW MEDS BY RX/DR IN RCRD: CPT | Performed by: SPECIALIST

## 2025-06-19 RX ORDER — TRAMADOL HYDROCHLORIDE 50 MG/1
50 TABLET, FILM COATED ORAL 2 TIMES DAILY PRN
COMMUNITY
Start: 2025-06-11 | End: 2025-09-09

## 2025-06-19 ASSESSMENT — PAIN - FUNCTIONAL ASSESSMENT: PAIN_FUNCTIONAL_ASSESSMENT: 0-10

## 2025-06-19 ASSESSMENT — PAIN DESCRIPTION - DESCRIPTORS: DESCRIPTORS: ACHING

## 2025-06-19 ASSESSMENT — PAIN SCALES - GENERAL: PAINLEVEL_OUTOF10: 7

## 2025-06-19 NOTE — PROGRESS NOTES
Assessment/Plan   Encounter Diagnoses:  Primary osteoarthritis of right knee  Primary osteoarthritis of right knee  Assessment: Right knee arthritis much improved with Voltaren gel and bracing.  She states she is 80- 90% better.  She is diabetic.  Hemoglobin A1c is 6.6.    Plan:  Continue with her home exercise program.  Use the cane on a as needed basis.  Follow-up in 3 months for recheck.  We discussed the risks and benefits of various treatment options including a booster cortisone injection or viscosupplementation injections.  She wants to defer this for now but she would consider it in the future.       Subjective    Patient ID: Nanette Elliott is a 69 y.o. female.    Chief Complaint: Follow-up and Pain of the Right Knee (X-RAYS 12-19-24/HAS KNEE BRACE)     Last Surgery: No surgery found  Last Surgery Date: No surgery found    HPI  68-year-old with primary osteoarthritis of the knee.  Bracing, Voltaren gel, use of a cane and home exercises have helped her improve her situation.  She states she is 80% improved.    12/19/2024-she states that she is at her baseline.  She remains about 80% improved from her initial presentation.  She uses a knee brace and the Voltaren gel is quite helpful.    OBJECTIVE: ORTHO EXAM  Right knee exam  Scant to no effusion.  0 to 130 degrees range of motion.  No calor or erythema.  Nontender to palpation including both joint lines.  Her ligamentous exam is unchanged.      IMAGE RESULTS:  Point of Care Ultrasound  These images are not reportable by radiology and will not be interpreted   by  Radiologists.      ULTRASOUND  None    Procedures     Orders Placed This Encounter    XR knee right 4+ views    Point of Care Ultrasound

## 2025-06-19 NOTE — ASSESSMENT & PLAN NOTE
Assessment: Right knee arthritis much improved with Voltaren gel and bracing.  She states she is 80- 90% better.  She is diabetic.  Hemoglobin A1c is 6.6.    Plan:  Continue with her home exercise program.  Use the cane on a as needed basis.  Follow-up in 3 months for recheck.  We discussed the risks and benefits of various treatment options including a booster cortisone injection or viscosupplementation injections.  She wants to defer this for now but she would consider it in the future.

## 2025-07-26 ENCOUNTER — OFFICE VISIT (OUTPATIENT)
Dept: URGENT CARE | Facility: CLINIC | Age: 70
End: 2025-07-26
Payer: MEDICARE

## 2025-07-26 VITALS
SYSTOLIC BLOOD PRESSURE: 149 MMHG | RESPIRATION RATE: 12 BRPM | HEART RATE: 88 BPM | DIASTOLIC BLOOD PRESSURE: 80 MMHG | OXYGEN SATURATION: 96 % | TEMPERATURE: 98.5 F

## 2025-07-26 DIAGNOSIS — R31.9 HEMATURIA, UNSPECIFIED TYPE: Primary | ICD-10-CM

## 2025-07-26 LAB
POC APPEARANCE, URINE: CLEAR
POC BILIRUBIN, URINE: NEGATIVE
POC BLOOD, URINE: NEGATIVE
POC COLOR, URINE: NORMAL
POC GLUCOSE, URINE: NEGATIVE MG/DL
POC KETONES, URINE: NEGATIVE MG/DL
POC LEUKOCYTES, URINE: NEGATIVE
POC NITRITE,URINE: NEGATIVE
POC PH, URINE: 6.5 PH
POC PROTEIN, URINE: NEGATIVE MG/DL
POC SPECIFIC GRAVITY, URINE: 1.01
POC UROBILINOGEN, URINE: 0.2 EU/DL

## 2025-07-26 PROCEDURE — 81002 URINALYSIS NONAUTO W/O SCOPE: CPT | Performed by: NURSE PRACTITIONER

## 2025-07-26 PROCEDURE — 99213 OFFICE O/P EST LOW 20 MIN: CPT | Performed by: NURSE PRACTITIONER

## 2025-07-26 NOTE — PROGRESS NOTES
Subjective     HPI  69 y.o. female presents for evaluation of bright red blood on her toilet paper when she wiped and seen blood in the toilet as well yesterday.  States she is unsure if this is coming from her urine, vagina or rectum. States she has only had one episode of small amount of bright red blood on toilet paper this morning, not seeing any blood in toilet. States last time for the UTI she had hematuria.  Denies fevers, abdominal pain, flank pains, dysuria, nausea vomiting, diarrhea, body aches, fatigue, change in mentation, dizziness or other associated similar complaint.  Patient is not on blood thinners.  States she has a history of hemorrhoids but had hemorrhoidectomy last year and has not had any problems since.  She is also postmenopausal and has not had any issues with vaginal bleeding.  No OTC remedies for symptom management.  No other complaints.    ROS  See HPI    Objective     Vitals:    07/26/25 1305   BP: 149/80   Pulse: 88   Resp: 12   Temp: 36.9 °C (98.5 °F)   SpO2: 96%       RX Allergies[1]    Medication Documentation Review Audit       Reviewed by Amalia Red MA (Medical Assistant) on 07/26/25 at 1305      Medication Order Taking? Sig Documenting Provider Last Dose Status   acetaminophen (Tylenol) 500 mg tablet [id number] Yes Take 1 tablet (500 mg) by mouth every 6 hours if needed for mild pain (1 - 3). Historical Provider, MD  Active   albuterol 90 mcg/actuation inhaler [id number] Yes Inhale 4 puffs every 6 hours if needed for wheezing. Hyacinth Harper, DO  Active   alendronate (Fosamax) 70 mg tablet [id number] Yes Take 1 tablet (70 mg) by mouth every 7 days. Take in the morning with a full glass of water, on an empty stomach, and do not take anything else by mouth or lie down for the next 30 min. Hyacinth Harper, DO Past Week Active   Arthritis Pain, diclofenac, 1 % gel [id number] Yes Apply 4.5 inches (4 grams) topically 4 times a day as needed (pain, stiffness, swelling).  Malini Benítez, APRN-CNP  Active   budesonide-formoterol (Symbicort) 80-4.5 mcg/actuation inhaler [id number] Yes Inhale 2 puffs 2 times a day. Rinse mouth with water after use to reduce aftertaste and incidence of candidiasis. Do not swallow. Zora Yip MD  Active   estradiol (Estrace) 0.01 % (0.1 mg/gram) vaginal cream [id number] Yes Insert 0.5 Applicatorfuls (2 g) into the vagina once daily at bedtime. At bedtime for 2 weeks, then at bedtime twice a week. Alli Nicole MD  Active   ezetimibe (Zetia) 10 mg tablet [id number] Yes Take 1 tablet (10 mg) by mouth once daily. Hyacinth Harper DO 3/7/2025 Morning Active   fluticasone (Flonase) 50 mcg/actuation nasal spray [id number] Yes Administer 2 sprays into each nostril once daily. Hyacinth Harper DO  Active   FreeStyle Lite Strips strip [id number] Yes TEST BLOOD SUGARS 1-2 TIMES PER DAY Sia Palacio MD 3/7/2025 Morning Active   gabapentin (Neurontin) 300 mg capsule [id number] Yes TAKE 2 CAPS Po IN THE MORNING, 2 CAPS po in THE AFTERNOON, and 3-4 CAPS po AT BEDTIME Hyacinth Harper DO 3/7/2025 Morning Active   ketoconazole (NIZOral) 2 % shampoo [id number] Yes Apply topically 2 times a week. Hyacinth Harper DO  Active   loratadine (Claritin) 10 mg tablet [id number] Yes Take 1 tablet (10 mg) by mouth once daily. Sia Palacio MD 3/7/2025 Morning Active   medroxyPROGESTERone (Provera) 10 mg tablet [id number] Yes Take 1 tablet (10 mg) by mouth once daily. Hyacinth Harper DO 3/7/2025 Morning Active   melatonin 10 mg capsule [id number] Yes Take 1 capsule (10 mg) by mouth once daily at bedtime. Sia Palacio MD 3/6/2025 Evening Active   metFORMIN  mg 24 hr tablet [id number] Yes Take 2 tablets (1,000 mg) by mouth 2 times daily (morning and late afternoon). Hyacinth Harper,  3/7/2025 Morning Active   traMADol (Ultram) 50 mg tablet [id number] Yes Take 1 tablet (50 mg) by mouth 2 times a day as needed. Historical Provider, MD  Active    valsartan (Diovan) 40 mg tablet [id number] Yes Take 1 tablet (40 mg) by mouth once daily. Hyacinth Harper,  3/6/2025 Evening Active                    Medical History[2]    Surgical History[3]        Physical Exam  Vitals and nursing note reviewed.   Constitutional:       Appearance: Normal appearance.     Cardiovascular:      Rate and Rhythm: Normal rate.   Abdominal:      General: There is no distension.      Palpations: Abdomen is soft.      Tenderness: There is no abdominal tenderness. There is no right CVA tenderness, left CVA tenderness or guarding.   Genitourinary:     General: Normal vulva.      Vagina: No vaginal discharge.      Rectum: Normal.      Comments: Per pt report, patient declines exam today    Skin:     General: Skin is warm and dry.     Neurological:      General: No focal deficit present.      Mental Status: She is alert and oriented to person, place, and time.     Psychiatric:         Mood and Affect: Mood normal.         Behavior: Behavior normal.       Recent Results (from the past hour)   POCT UA (nonautomated w/o microscopy) manually resulted    Collection Time: 07/26/25  1:13 PM   Result Value Ref Range    POC Color, Urine Light-Yellow Straw, Yellow, Light-Yellow    POC Appearance, Urine Clear Clear    POC Glucose, Urine NEGATIVE NEGATIVE mg/dl    POC Bilirubin, Urine NEGATIVE NEGATIVE    POC Ketones, Urine NEGATIVE NEGATIVE mg/dl    POC Specific Gravity, Urine 1.010 1.005 - 1.035    POC Blood, Urine NEGATIVE NEGATIVE    POC PH, Urine 6.5 No Reference Range Established PH    POC Protein, Urine NEGATIVE NEGATIVE mg/dl    POC Urobilinogen, Urine 0.2 0.2, 1.0 EU/DL    Poc Nitrite, Urine NEGATIVE NEGATIVE    POC Leukocytes, Urine NEGATIVE NEGATIVE         Assessment     Assessment/Plan/MDM  Nanette was seen today for blood in urine.  Diagnoses and all orders for this visit:  Hematuria, unspecified type (Primary)  -     POCT UA (nonautomated w/o microscopy) manually resulted    POC UA  unremarkable today. Pt afebrile, no abdominal or flank pains, A&O x 3. Patient declines vaginal or rectal exam today. States she will follow up with PCP or GYN if symptoms persist.  Patient's clinical presentation is otherwise unremarkable at this time. Patient is discharged with instructions to follow-up with primary care or seek emergency medical attention for worsening symptoms or any new concerns.    At time of discharge patient was clinically well-appearing and appropriate for outpatient management. He was educated regarding diagnosis, supportive care, OTC and any Rx medications. He was given the opportunity to ask questions prior to discharge.  They verbalized understanding of my discussion of the plans for treatment, expected course, indications to return to UC or seek further evaluation in ED, and the need for timely follow up as directed.    I did personally review Nanette's past medical history, surgical history, social history, as well as family history (when relevant).  In this case, I also oversaw the her drug management by reviewing her medication list, allergy list, as well as the medications that I prescribed during the UC course and/or recommended as an out-patient (including possible OTC medications such as acetaminophen, NSAIDs , etc).    After reviewing the items above, I did look at previous medical documentation, such as recent hospitalizations, office visits, and/or recent consultations with PCP/specialist.                          SDOH:   Another factor that I considered in Nanette's care was her Social Determinants of Health (SDOH). During this UC encounter, she did not have social determinants of health. Those SDOH influencing Nanette's care are: none      Juancho Covarrubias CNP  [facility]           [1]   Allergies  Allergen Reactions    Sulfamethoxazole-Trimethoprim Rash    House Dust Unknown    Baclofen Other     vomiting   [2]   Past Medical History:  Diagnosis Date     Asymptomatic menopausal state     History of menopause    COPD (chronic obstructive pulmonary disease) (Multi)     Diabetes (Multi)     Encounter for full-term uncomplicated delivery     Normal vaginal delivery    Encounter for gynecological examination (general) (routine) without abnormal findings 11/05/2021    Pap test, as part of routine gynecological examination    Hearing disorder     Hypertension     Other conditions influencing health status     Menstruation    Personal history of other medical treatment     History of screening mammography   [3]   Past Surgical History:  Procedure Laterality Date    COLONOSCOPY  11/18/2013    [facility]    COLONOSCOPY W/ POLYPECTOMY  05/08/2024    REPEAT 5 YRS/ DR RAZA    MR HEAD ANGIO WO IV CONTRAST  07/05/2023    MR HEAD ANGIO WO IV CONTRAST 7/5/2023 [facility]    MR NECK ANGIO WO IV CONTRAST  07/05/2023    MR NECK ANGIO WO IV CONTRAST 7/5/2023 [facility]    OTHER SURGICAL HISTORY  03/05/2020    Neck surgery    OTHER SURGICAL HISTORY  03/05/2020    Cataract surgery    OTHER SURGICAL HISTORY  03/05/2019    COLONOSCOPY REPEAT 5 YEARS/ DR CASTANON    OTHER SURGICAL HISTORY  12/23/2022    Epidural steroid injection    OTHER SURGICAL HISTORY  10/20/2022    Radiofrequency ablation    OTHER SURGICAL HISTORY  06/08/2022    Radiofrequency ablation    OTHER SURGICAL HISTORY  06/24/2022    Medial branch block    OTHER SURGICAL HISTORY  09/01/2022    Medial branch block    OTHER SURGICAL HISTORY  03/03/2022    Medial branch block    OTHER SURGICAL HISTORY  03/30/2022    Medial branch block    OTHER SURGICAL HISTORY  10/27/2020    Esophagogastroduodenoscopy    OTHER SURGICAL HISTORY  10/27/2020    Condyloma acuminata excision    OTHER SURGICAL HISTORY  08/19/2021    Epidural steroid injection    TUBAL LIGATION Bilateral 1980

## 2025-09-18 ENCOUNTER — APPOINTMENT (OUTPATIENT)
Dept: ORTHOPEDIC SURGERY | Facility: CLINIC | Age: 70
End: 2025-09-18
Payer: MEDICARE

## 2025-10-09 ENCOUNTER — APPOINTMENT (OUTPATIENT)
Age: 70
End: 2025-10-09
Payer: MEDICARE

## 2025-10-27 ENCOUNTER — APPOINTMENT (OUTPATIENT)
Age: 70
End: 2025-10-27
Payer: MEDICARE